# Patient Record
Sex: FEMALE | Race: WHITE | NOT HISPANIC OR LATINO | Employment: FULL TIME | URBAN - METROPOLITAN AREA
[De-identification: names, ages, dates, MRNs, and addresses within clinical notes are randomized per-mention and may not be internally consistent; named-entity substitution may affect disease eponyms.]

---

## 2017-01-18 ENCOUNTER — COMMUNICATION - HEALTHEAST (OUTPATIENT)
Dept: UROLOGY | Facility: CLINIC | Age: 42
End: 2017-01-18

## 2017-02-18 ENCOUNTER — COMMUNICATION - HEALTHEAST (OUTPATIENT)
Dept: EMERGENCY MEDICINE | Facility: CLINIC | Age: 42
End: 2017-02-18

## 2018-03-04 ENCOUNTER — HOSPITAL ENCOUNTER (EMERGENCY)
Facility: CLINIC | Age: 43
Discharge: HOME | End: 2018-03-04
Attending: EMERGENCY MEDICINE | Admitting: EMERGENCY MEDICINE
Payer: COMMERCIAL

## 2018-03-04 ENCOUNTER — APPOINTMENT (OUTPATIENT)
Dept: CT IMAGING | Facility: CLINIC | Age: 43
End: 2018-03-04
Attending: EMERGENCY MEDICINE
Payer: COMMERCIAL

## 2018-03-04 VITALS
OXYGEN SATURATION: 97 % | DIASTOLIC BLOOD PRESSURE: 79 MMHG | SYSTOLIC BLOOD PRESSURE: 114 MMHG | RESPIRATION RATE: 16 BRPM | HEIGHT: 63 IN | BODY MASS INDEX: 46.6 KG/M2 | HEART RATE: 87 BPM | WEIGHT: 263 LBS | TEMPERATURE: 97.5 F

## 2018-03-04 DIAGNOSIS — R10.9 FLANK PAIN: ICD-10-CM

## 2018-03-04 DIAGNOSIS — F41.9 ANXIETY: ICD-10-CM

## 2018-03-04 LAB
ALBUMIN SERPL-MCNC: 3.9 G/DL (ref 3.4–5)
ALBUMIN UR-MCNC: NEGATIVE MG/DL
ALP SERPL-CCNC: 88 U/L (ref 40–150)
ALT SERPL W P-5'-P-CCNC: 15 U/L (ref 0–50)
ANION GAP SERPL CALCULATED.3IONS-SCNC: 6 MMOL/L (ref 3–14)
APPEARANCE UR: ABNORMAL
AST SERPL W P-5'-P-CCNC: 10 U/L (ref 0–45)
BACTERIA #/AREA URNS HPF: ABNORMAL /HPF
BILIRUB SERPL-MCNC: 0.8 MG/DL (ref 0.2–1.3)
BILIRUB UR QL STRIP: NEGATIVE
BUN SERPL-MCNC: 10 MG/DL (ref 7–30)
CALCIUM SERPL-MCNC: 8.9 MG/DL (ref 8.5–10.1)
CHLORIDE SERPL-SCNC: 108 MMOL/L (ref 94–109)
CO2 SERPL-SCNC: 25 MMOL/L (ref 20–32)
COLOR UR AUTO: YELLOW
CREAT SERPL-MCNC: 0.64 MG/DL (ref 0.52–1.04)
D DIMER PPP FEU-MCNC: 0.3 UG/ML FEU (ref 0–0.5)
ERYTHROCYTE [DISTWIDTH] IN BLOOD BY AUTOMATED COUNT: 13.6 % (ref 10–15)
GFR SERPL CREATININE-BSD FRML MDRD: >90 ML/MIN/1.7M2
GLUCOSE SERPL-MCNC: 103 MG/DL (ref 70–99)
GLUCOSE UR STRIP-MCNC: NEGATIVE MG/DL
HCG SERPL QL: NEGATIVE
HCT VFR BLD AUTO: 45.1 % (ref 35–47)
HGB BLD-MCNC: 15.4 G/DL (ref 11.7–15.7)
HGB UR QL STRIP: ABNORMAL
KETONES UR STRIP-MCNC: NEGATIVE MG/DL
LEUKOCYTE ESTERASE UR QL STRIP: NEGATIVE
MCH RBC QN AUTO: 31.5 PG (ref 26.5–33)
MCHC RBC AUTO-ENTMCNC: 34.1 G/DL (ref 31.5–36.5)
MCV RBC AUTO: 92 FL (ref 78–100)
MUCOUS THREADS #/AREA URNS LPF: PRESENT /LPF
NITRATE UR QL: NEGATIVE
PH UR STRIP: 5 PH (ref 5–7)
PLATELET # BLD AUTO: 447 10E9/L (ref 150–450)
POTASSIUM SERPL-SCNC: 3.6 MMOL/L (ref 3.4–5.3)
PROT SERPL-MCNC: 7.6 G/DL (ref 6.8–8.8)
RBC # BLD AUTO: 4.89 10E12/L (ref 3.8–5.2)
RBC #/AREA URNS AUTO: 2 /HPF (ref 0–2)
SODIUM SERPL-SCNC: 139 MMOL/L (ref 133–144)
SOURCE: ABNORMAL
SP GR UR STRIP: 1.03 (ref 1–1.03)
SQUAMOUS #/AREA URNS AUTO: 2 /HPF (ref 0–1)
UROBILINOGEN UR STRIP-MCNC: 2 MG/DL (ref 0–2)
WBC # BLD AUTO: 12.4 10E9/L (ref 4–11)
WBC #/AREA URNS AUTO: 1 /HPF (ref 0–5)

## 2018-03-04 PROCEDURE — 99285 EMERGENCY DEPT VISIT HI MDM: CPT | Mod: 25

## 2018-03-04 PROCEDURE — 25000132 ZZH RX MED GY IP 250 OP 250 PS 637: Performed by: EMERGENCY MEDICINE

## 2018-03-04 PROCEDURE — 85027 COMPLETE CBC AUTOMATED: CPT | Performed by: EMERGENCY MEDICINE

## 2018-03-04 PROCEDURE — 84703 CHORIONIC GONADOTROPIN ASSAY: CPT | Performed by: EMERGENCY MEDICINE

## 2018-03-04 PROCEDURE — 80053 COMPREHEN METABOLIC PANEL: CPT | Performed by: EMERGENCY MEDICINE

## 2018-03-04 PROCEDURE — 81001 URINALYSIS AUTO W/SCOPE: CPT | Performed by: EMERGENCY MEDICINE

## 2018-03-04 PROCEDURE — 85379 FIBRIN DEGRADATION QUANT: CPT | Performed by: EMERGENCY MEDICINE

## 2018-03-04 PROCEDURE — 93005 ELECTROCARDIOGRAM TRACING: CPT

## 2018-03-04 PROCEDURE — 25000125 ZZHC RX 250: Performed by: EMERGENCY MEDICINE

## 2018-03-04 PROCEDURE — 74176 CT ABD & PELVIS W/O CONTRAST: CPT

## 2018-03-04 RX ORDER — LORAZEPAM 0.5 MG/1
0.5 TABLET ORAL ONCE
Status: COMPLETED | OUTPATIENT
Start: 2018-03-04 | End: 2018-03-04

## 2018-03-04 RX ORDER — ONDANSETRON 4 MG/1
4 TABLET, ORALLY DISINTEGRATING ORAL ONCE
Status: COMPLETED | OUTPATIENT
Start: 2018-03-04 | End: 2018-03-04

## 2018-03-04 RX ADMIN — LORAZEPAM 0.5 MG: 0.5 TABLET ORAL at 10:31

## 2018-03-04 RX ADMIN — ONDANSETRON 4 MG: 4 TABLET, ORALLY DISINTEGRATING ORAL at 12:47

## 2018-03-04 ASSESSMENT — ENCOUNTER SYMPTOMS
DIZZINESS: 0
HEADACHES: 0
NERVOUS/ANXIOUS: 1
DIARRHEA: 0
FATIGUE: 0
CHILLS: 0
BLOOD IN STOOL: 0
VOMITING: 0
NAUSEA: 1
CONSTIPATION: 0
DYSURIA: 0
WEAKNESS: 0
COUGH: 0
FLANK PAIN: 1
DIFFICULTY URINATING: 0
FEVER: 1
ABDOMINAL PAIN: 1
SHORTNESS OF BREATH: 0

## 2018-03-04 NOTE — ED AVS SNAPSHOT
[facility] Emergency Department    [address]    [facility] [address]    Phone:  [phone]    Fax:  [phone]                                       Isha Valderrama   MRN: [id number]    Department:  [facility] Emergency Department   Date of Visit:  3/4/2018           Patient Information     Date Of Birth          1975        Your diagnoses for this visit were:     Anxiety     Flank pain        You were seen by Roderick Bill MD.      Follow-up Information     Follow up with Dewayne Barrera MD. Schedule an appointment as soon as possible for a visit in 2 days.    Contact information:    [facility]  [address]    [facility] [place] [address]  [phone]          Follow up with [facility] Emergency Department.    Specialty:  EMERGENCY MEDICINE    Why:  If symptoms worsen    Contact information:    [address] [facility] [id number] [phone]        Discharge Instructions         Understanding Anxiety Disorders  Almost everyone gets nervous now and then. It s normal to have knots in your stomach before a test, or for your heart to race on a first date. But an anxiety disorder is much more than a case of nerves. In fact, its symptoms may be overwhelming. But treatment can relieve many of these symptoms. Talking to your healthcare provider is the first step.    What are anxiety disorders?  An anxiety disorder causes intense feelings of panic and fear. These feelings may arise for no apparent reason. And they tend to recur again and again. They may prevent you from coping with life and cause you great distress. As a result, you may avoid anything that triggers your fear. In extreme cases, you may never leave the house. Anxiety disorders may cause other symptoms, such as:    Obsessive thoughts you can t control    Constant nightmares or painful thoughts of the past    Nausea, sweating, and muscle  tension    Trouble sleeping or concentrating  What causes anxiety disorders?  Anxiety disorders tend to run in families. For some people, childhood abuse or neglect may play a role. For others, stressful life events or trauma may trigger anxiety disorders. Anxiety can trigger low self-esteem and poor coping skills.  Common anxiety disorders    Panic disorder. This causes an intense fear of being in danger.    Phobias. These are extreme fears of certain objects, places, or events.    Obsessive-compulsive disorder. This causes you to have unwanted thoughts and urges. You also may perform certain actions over and over.    Posttraumatic stress disorder. This occurs in people who have survived a terrible ordeal. It can cause nightmares and flashbacks about the event.    Generalized anxiety disorder. This causes constant worry that can greatly disrupt your life.   Getting better  You may believe that nothing can help you. Or, you might fear what others may think. But most anxiety symptoms can be eased. Having an anxiety disorder is nothing to be ashamed of. Most people do best with treatment that combines medicine and therapy. These aren t cures. But they can help you live a healthier life.  Date Last Reviewed: 2/1/2017 2000-2017 [employer]. [address] [place], [place] [address]. All rights reserved. This information is not intended as a substitute for professional medical care. Always follow your healthcare professional's instructions.          24 Hour Appointment Hotline       To make an appointment at any [facility], call [phone] ([phone]). If you don't have a family doctor or clinic, we will help you find one. [facility] are conveniently located to serve the needs of you and your family.             Review of your medicines      Our records show that you are taking the medicines listed below. If these are incorrect, please call your family doctor or clinic.        Dose /  Directions Last dose taken    ALBUTEROL IN        Refills:  0        DEPO-PROVERA IM        Refills:  0        FLOVENT IN        Refills:  0        ibuprofen 200 MG tablet   Commonly known as:  ADVIL/MOTRIN   Dose:  600 mg   Quantity:  60 tablet        Take 3 tablets (600 mg) by mouth every 8 hours as needed for mild pain   Refills:  0        ketorolac 10 MG tablet   Commonly known as:  TORADOL   Dose:  10 mg   Quantity:  8 tablet        Take 1 tablet (10 mg) by mouth every 6 hours as needed for moderate pain   Refills:  0        LORAZEPAM PO        Refills:  0        oxyCODONE-acetaminophen 5-325 MG per tablet   Commonly known as:  PERCOCET   Dose:  1 tablet   Quantity:  20 tablet        Take 1 tablet by mouth every 6 hours as needed for moderate to severe pain   Refills:  0        SERTRALINE HCL PO        Take by mouth daily   Refills:  0        TYLENOL PO        Refills:  0                Procedures and tests performed during your visit     Abd/pelvis CT no contrast - Stone Protocol    CBC (platelets, no diff)    Comprehensive metabolic panel    D dimer quantitative    EKG 12 lead    HCG QUALitative pregnancy (blood)    UA with Microscopic      Orders Needing Specimen Collection     None      Pending Results     Date and Time Order Name Status Description    3/4/2018 1006 EKG 12 lead Preliminary             Pending Culture Results     No orders found from 3/2/2018 to 3/5/2018.            Pending Results Instructions     If you had any lab results that were not finalized at the time of your Discharge, you can call the ED Lab Result RN at [phone]. You will be contacted by this team for any positive Lab results or changes in treatment. The nurses are available 7 days a week from 10A to 6:30P.  You can leave a message 24 hours per day and they will return your call.        Test Results From Your Hospital Stay        3/4/2018 10:43 AM      Component Results     Component Value Ref Range & Units Status    WBC 12.4  (H) 4.0 - 11.0 10e9/L Final    RBC Count 4.89 3.8 - 5.2 10e12/L Final    Hemoglobin 15.4 11.7 - 15.7 g/dL Final    Hematocrit 45.1 35.0 - 47.0 % Final    MCV 92 78 - 100 fl Final    MCH 31.5 26.5 - 33.0 pg Final    MCHC 34.1 31.5 - 36.5 g/dL Final    RDW 13.6 10.0 - 15.0 % Final    Platelet Count 447 150 - 450 10e9/L Final         3/4/2018 10:59 AM      Component Results     Component Value Ref Range & Units Status    Sodium 139 133 - 144 mmol/L Final    Potassium 3.6 3.4 - 5.3 mmol/L Final    Chloride 108 94 - 109 mmol/L Final    Carbon Dioxide 25 20 - 32 mmol/L Final    Anion Gap 6 3 - 14 mmol/L Final    Glucose 103 (H) 70 - 99 mg/dL Final    Urea Nitrogen 10 7 - 30 mg/dL Final    Creatinine 0.64 0.52 - 1.04 mg/dL Final    GFR Estimate >90 >60 mL/min/1.7m2 Final    Non  GFR Calc    GFR Estimate If Black >90 >60 mL/min/1.7m2 Final    African American GFR Calc    Calcium 8.9 8.5 - 10.1 mg/dL Final    Bilirubin Total 0.8 0.2 - 1.3 mg/dL Final    Albumin 3.9 3.4 - 5.0 g/dL Final    Protein Total 7.6 6.8 - 8.8 g/dL Final    Alkaline Phosphatase 88 40 - 150 U/L Final    ALT 15 0 - 50 U/L Final    AST 10 0 - 45 U/L Final         3/4/2018 10:59 AM      Component Results     Component Value Ref Range & Units Status    HCG Qualitative Serum Negative NEG^Negative Final    This test is for screening purposes.  Results should be interpreted along with   the clinical picture.  Confirmation testing is available if warranted by   ordering FXW196, HCG Quantitative Pregnancy.           3/4/2018 10:54 AM      Component Results     Component Value Ref Range & Units Status    D Dimer 0.3 0.0 - 0.50 ug/ml FEU Final    This D-dimer assay is intended for use in conjunction with a clinical pretest   probability assessment model to exclude pulmonary embolism (PE) and deep   venous thrombosis (DVT) in outpatients suspected of PE or DVT. The cut-off   value is 0.5 ug/mL FEU.           3/4/2018 11:02 AM      Component Results      Component Value Ref Range & Units Status    Color Urine Yellow  Final    Appearance Urine Slightly Cloudy  Final    Glucose Urine Negative NEG^Negative mg/dL Final    Bilirubin Urine Negative NEG^Negative Final    Ketones Urine Negative NEG^Negative mg/dL Final    Specific Gravity Urine 1.028 1.003 - 1.035 Final    Blood Urine Moderate (A) NEG^Negative Final    pH Urine 5.0 5.0 - 7.0 pH Final    Protein Albumin Urine Negative NEG^Negative mg/dL Final    Urobilinogen mg/dL 2.0 0.0 - 2.0 mg/dL Final    Nitrite Urine Negative NEG^Negative Final    Leukocyte Esterase Urine Negative NEG^Negative Final    Source Midstream Urine  Final    WBC Urine 1 0 - 5 /HPF Final    RBC Urine 2 0 - 2 /HPF Final    Bacteria Urine Few (A) NEG^Negative /HPF Final    Squamous Epithelial /HPF Urine 2 (H) 0 - 1 /HPF Final    Mucous Urine Present (A) NEG^Negative /LPF Final         3/4/2018 12:02 PM      Narrative     CT ABDOMEN AND PELVIS WITHOUT CONTRAST 3/4/2018 11:37 AM     HISTORY: Left flank pain.      COMPARISON: CT abdomen and pelvis 12/16/2015.    TECHNIQUE: Axial images are obtained from the lung bases to the  symphysis without oral or IV contrast. Coronal reformatted images are  also generated.  Radiation dose for this scan was reduced using  automated exposure control, adjustment of the mA and/or kV according  to patient size, or iterative reconstruction technique.    FINDINGS:   The lung bases are clear.    Abdomen: No urinary tract calculi or hydronephrosis. The upper  abdominal organs are otherwise within normal limits allowing for the  noncontrast technique. Probable liver cyst or hemangioma anterior left  hepatic lobe on series 2, image 22 is again noted. Probable additional  cyst or hemangioma lateral segment left hepatic lobe barely evident on  the current exam. This is too small to characterize by CT. Calcine  granuloma is noted in the spleen. No enlarged abdominal lymph nodes.  The bowel is normal in caliber without  obstruction or diverticulitis.  Appendix is normal.    Pelvis: The bladder is decompressed but otherwise unremarkable. The  uterus, ovaries, and rectum are unremarkable. Bone window examination  demonstrates degenerative lower thoracic and lumbar spine changes.        Impression     IMPRESSION: No urinary tract calculi or hydronephrosis. No acute  changes are evident in the abdomen or pelvis to account for patient's  symptoms. No bowel obstruction, diverticulitis, or appendicitis.  Indeterminate liver lesions are likely small cysts or hemangiomas.  These are incompletely characterized on this noncontrast exam.    HOSEA GREGG MD                Clinical Quality Measure: Blood Pressure Screening     Your blood pressure was checked while you were in the emergency department today. The last reading we obtained was  BP: 114/79 . Please read the guidelines below about what these numbers mean and what you should do about them.  If your systolic blood pressure (the top number) is less than 120 and your diastolic blood pressure (the bottom number) is less than 80, then your blood pressure is normal. There is nothing more that you need to do about it.  If your systolic blood pressure (the top number) is 120-139 or your diastolic blood pressure (the bottom number) is 80-89, your blood pressure may be higher than it should be. You should have your blood pressure rechecked within a year by a primary care provider.  If your systolic blood pressure (the top number) is 140 or greater or your diastolic blood pressure (the bottom number) is 90 or greater, you may have high blood pressure. High blood pressure is treatable, but if left untreated over time it can put you at risk for heart attack, stroke, or kidney failure. You should have your blood pressure rechecked by a primary care provider within the next 4 weeks.  If your provider in the emergency department today gave you specific instructions to follow-up with your doctor or  "provider even sooner than that, you should follow that instruction and not wait for up to 4 weeks for your follow-up visit.        Thank you for choosing [facility]       Thank you for choosing [facility] for your care. Our goal is always to provide you with excellent care. Hearing back from our patients is one way we can continue to improve our services. Please take a few minutes to complete the written survey that you may receive in the mail after you visit with us. Thank you!        [employer] Information     [employer] lets you send messages to your doctor, view your test results, renew your prescriptions, schedule appointments and more. To sign up, go to [email or web address] . Click on \"Log in\" on the left side of the screen, which will take you to the Welcome page. Then click on \"Sign up Now\" on the right side of the page.     You will be asked to enter the access code listed below, as well as some personal information. Please follow the directions to create your username and password.     Your access code is: [email or web address]  Expires: 2018 12:38 PM     Your access code will  in 90 days. If you need help or a new code, please call your [facility] clinic or [phone].        Care EveryWhere ID     This is your Care EveryWhere ID. This could be used by other organizations to access your [facility] medical records  [id number]        Equal Access to Services     GE SADLER AH: Hadii brian Escudero, waaxda luqadaha, qaybta kaalcatie luna. So [facility] [phone].    ATENCIÓN: Si habla español, tiene a cobb disposición servicios gratuitos de asistencia lingüística. Joel al [phone].    We comply with applicable federal civil rights laws and [place] laws. We do not discriminate on the basis of race, color, national origin, age, disability, sex, sexual orientation, or gender identity.            After Visit Summary       This is your record. Keep this with you " and show to your community pharmacist(s) and doctor(s) at your next visit.

## 2018-03-04 NOTE — LETTER
March 4, 2018      To Whom It May Concern:      Isha Valderrama was seen in our Emergency Department today, 03/04/18.  I expect her condition to improve over the next 1-2 days.  She may return to work/school when improved.    Sincerely,        Collette Ochoa RN

## 2018-03-04 NOTE — ED AVS SNAPSHOT
[facility] Emergency Department    [address]    [facility] [address]    Phone:  [phone]    Fax:  [phone]                                       Isha Valderrama   MRN: [id number]    Department:  [facility] Emergency Department   Date of Visit:  3/4/2018           After Visit Summary Signature Page     I have received my discharge instructions, and my questions have been answered. I have discussed any challenges I see with this plan with the nurse or doctor.    ..........................................................................................................................................  Patient/Patient Representative Signature      ..........................................................................................................................................  Patient Representative Print Name and Relationship to Patient    ..................................................               ................................................  Date                                            Time    ..........................................................................................................................................  Reviewed by Signature/Title    ...................................................              ..............................................  Date                                                            Time

## 2018-03-04 NOTE — DISCHARGE INSTRUCTIONS
Understanding Anxiety Disorders  Almost everyone gets nervous now and then. It s normal to have knots in your stomach before a test, or for your heart to race on a first date. But an anxiety disorder is much more than a case of nerves. In fact, its symptoms may be overwhelming. But treatment can relieve many of these symptoms. Talking to your healthcare provider is the first step.    What are anxiety disorders?  An anxiety disorder causes intense feelings of panic and fear. These feelings may arise for no apparent reason. And they tend to recur again and again. They may prevent you from coping with life and cause you great distress. As a result, you may avoid anything that triggers your fear. In extreme cases, you may never leave the house. Anxiety disorders may cause other symptoms, such as:    Obsessive thoughts you can t control    Constant nightmares or painful thoughts of the past    Nausea, sweating, and muscle tension    Trouble sleeping or concentrating  What causes anxiety disorders?  Anxiety disorders tend to run in families. For some people, childhood abuse or neglect may play a role. For others, stressful life events or trauma may trigger anxiety disorders. Anxiety can trigger low self-esteem and poor coping skills.  Common anxiety disorders    Panic disorder. This causes an intense fear of being in danger.    Phobias. These are extreme fears of certain objects, places, or events.    Obsessive-compulsive disorder. This causes you to have unwanted thoughts and urges. You also may perform certain actions over and over.    Posttraumatic stress disorder. This occurs in people who have survived a terrible ordeal. It can cause nightmares and flashbacks about the event.    Generalized anxiety disorder. This causes constant worry that can greatly disrupt your life.   Getting better  You may believe that nothing can help you. Or, you might fear what others may think. But most anxiety symptoms can be eased.  Having an anxiety disorder is nothing to be ashamed of. Most people do best with treatment that combines medicine and therapy. These aren t cures. But they can help you live a healthier life.  Date Last Reviewed: 2/1/2017 2000-2017 [employer]. [address] [place] [address], [place] [address]. All rights reserved. This information is not intended as a substitute for professional medical care. Always follow your healthcare professional's instructions.

## 2018-03-04 NOTE — ED NOTES
Complains of nausea. Given a zofran odt prior to discharge. She has these at home. Still reporting very mild left flank pain. Still with some anxiety, though she states the 0.5 mg ativan helped some. Given work note.

## 2018-03-04 NOTE — ED PROVIDER NOTES
History     Chief Complaint:  Anxiety, Flank Pain, and Cyst    HPI   Isha Valderrama is a 42 year old female who presents with anxiety, flank pain, and cyst. The patient presents today stating that she has been experiencing symptoms of left sided flank pain, nausea, low grade fevers, and slight abdominal pain for the past few days. She reports a history of anxiety and states that her symptoms have caused her to become worried for serious pathology. She has a history of kidney stones and reports that her flank pain feels somewhat similar to that of previous episodes of kidney stones, although questions if she is early in the course of her illness. Of note, patient also reports a cyst on her right breast that ruptured as of last night, but has not been causing any bleeding or discharge. There was no report of bowel complications, urinary symptoms, headaches, weakness, syncope, or any other symptoms. She denies any thoughts of suicide or self harm.  She declines the opportunity to speak with our counselor/DEC.    Allergies:  Compazine [Prochlorperazine]  Morphine     Medications:    Acetaminophen (TYLENOL PO)  SERTRALINE HCL PO  LORAZEPAM PO  MedroxyPROGESTERone Acetate (DEPO-PROVERA IM)  ALBUTEROL IN  ibuprofen (ADVIL,MOTRIN) 200 MG tablet  ketorolac (TORADOL) 10 MG tablet      Past Medical History:    Anxiety  Kidney stones  Uncomplicated asthma    Past Surgical History:    Orthopedic surgery  Bilateral ureteral stents  Tonsillectomy    Family History:    No pertinent family history.    Social History:  Smoking status: Current smoker  Alcohol use: No  Marital Status:  Single      Review of Systems   Constitutional: Positive for fever. Negative for chills and fatigue.   Respiratory: Negative for cough and shortness of breath.    Cardiovascular: Negative for chest pain.   Gastrointestinal: Positive for abdominal pain and nausea. Negative for blood in stool, constipation, diarrhea and vomiting.   Genitourinary:  "Positive for flank pain. Negative for difficulty urinating and dysuria.   Skin: Negative for rash.   Neurological: Negative for dizziness, weakness and headaches.   Psychiatric/Behavioral: The patient is nervous/anxious.    All other systems reviewed and are negative.    Physical Exam     Patient Vitals for the past 24 hrs:   BP Temp Temp src Pulse Heart Rate Resp SpO2 Height Weight   03/04/18 1007 114/79 97.5  F (36.4  C) Temporal 87 87 16 97 % 1.6 m (5' 3\") 119.3 kg (263 lb)         Physical Exam  General:                        Well-nourished                        Speaking in full sentences                        Well appearing                        Anxious affect  Eyes:                        Conjunctiva without injection or scleral icterus                        PERRL  ENT:                        Moist mucous membranes                        Posterior oropharynx clear without erythema or exudate                        Nares patent                        Pinnae normal  Neck:                        Full ROM                        No stiffness appreciated  Resp:                        Lungs CTAB                        No crackles, wheezing or audible rubs                        Good air movement  CV:                                        Normal rate, regular rhythm                        S1 and S2 present                        No murmur, gallop or rub  Breast:                        (With female chaperone present)                        A few erythematous lesions, no fluctuance, no focal tenderness to palpation  GI:                        BS present                        Abdomen soft without distention                        Non-tender to light and deep palpation                        No guarding or rebound tenderness                        Tenderness to palpation over left CVA region  Skin:                        Warm, dry, well perfused                        No rashes or open wounds on exposed " skin  MSK:                        Moves all extremities                        No focal deformities or swelling  Neuro:                        Alert                        Answers questions appropriately                        Moves all extremities equally                        Gait stable  Psych:                        Anxious                        Maintains eye contact                        Not attending to internal stimuli                        Denies SI or HI    Emergency Department Course   ECG:  @ 1038  Indication: anxiety  Vent. Rate 66 bpm. AK interval 128 ms. QRS duration 88 ms. QT/QTc 386/404 ms. P-R-T axis 43 34 24.   Normal sinus rhythm. Normal ECG.    Read @ 1041 by Dr. Bill.    Imaging:  Abdomen/Pelvis CT with IV contrast:  IMPRESSION: No urinary tract calculi or hydronephrosis. No acute changes are evident in the abdomen or pelvis to account for patient's symptoms. No bowel obstruction, diverticulitis, or appendicitis. Indeterminate liver lesions are likely small cysts or hemangiomas. These are incompletely characterized on this noncontrast exam.  Report per radiology.      Laboratory:  CBC:  WBC 12.4 (H), HGB 15.4, , otherwise WNL  CMP: Glucose 103 (H), otherwise WNL (Creatinine 0.64)  (1020) D Dimer: 0.3  HCG: negative    UA: Slightly Cloudy yellow urine, Blood Moderate, Bacteria Few, Squamous Epithelial 2 (H), Mucous Present, otherwise WNL    Interventions:  (1031) Lorazepam, 0.5 mg, IV injection    Emergency Department Course:  Nursing notes and vitals reviewed.  (0959) I performed an exam of the patient as documented above.    Blood was drawn from the patient. This was sent for laboratory testing, findings above.  Urine sample was obtained and sent for laboratory analysis, findings above.  EKG was done, interpretation as above.    Findings and plan explained to the patient. Patient discharged home with instructions regarding supportive care, medications, and reasons to return. The  importance of close follow-up was reviewed.   Impression & Plan    Medical Decision Making:  Isha Valderrama is a 42 yr old F presenting to the emergency deferment for evaluation of anxiety, flank pain, and possible cyst.  VS on presentation are unremarkable.  With regards to patient's anxiety, she notes a history of chronic anxiety and panic attacks.  She feels as though her current symptoms are similar to that although is unaware of any particular trigger.  She notes prior flares of anxiety are not necessarily exacerbated or associated with any particular stressor.  She is currently on sertraline and as needed Ativan for her treatment at home.  She denies any thoughts of self-harm or suicide.  I offered for her to speak with our counselor although she politely declined.  She was provided 1 tab of oral Ativan with some improvements in symptoms. Pt not meeting criteria for involuntary hold and is not felt at risk of self harm or harm to others. I considered other etiologies or contributing factors to her current presentation.  She notes associated left-sided flank pain and has a history of kidney stones.  Urinalysis does not reveal convincing evidence of infection nor evidence of red blood cells.  After discussion of risks and benefits, the patient elected to proceed with noncontrast CT which demonstrates no evidence of hydronephrosis nor urinary stone.  She does have indeterminate liver lesions, likely small cysts or hemangiomas although no evidence of bowel obstruction, diverticulitis or acute appendicitis. This was reviewed with the patient. She exhibits no lower abdominal pain to suggest referred ovarian process.  I considered for pulmonary process such as pulmonary embolism though feel this to be unlikely.  Patient is low risk by well's criteria and d-dimer is within normal limits.  Her pulmonary exam is otherwise clear and patient denies associated shortness of breath nor chest pain.  Pregnancy test is  negative.  Metabolic panel unremarkable.  CBC reveals mild leukocytosis with a WBC count of 12.4 though otherwise is negative.  She expressed concern regarding possible cyst beneath her right breast although on exam she does not have any region of fluctuance amenable to percutaneous drainage at this time.  She noted spontaneous drainage yesterday evening, and I suspect this has resolved her underlying subcutaneous abscess.  Recommended she continue to monitor this area and may use warm compresses to assist with drainage.  She does not exhibit signs to suggest cellulitis at this time.  Results of the above studies were discussed with the patient.  At this point I feel she can be safely discharged home with close outpatient follow-up.  Recommended close monitoring of symptoms, and follow-up with PCP early this coming week.  Return to ER with any new or troubling symptoms.  All questions are answered prior to discharge.       Diagnosis:    ICD-10-CM   1. Anxiety F41.9   2. Flank pain R10.9       Disposition:  Patient is discharged to home.            IEarnest, am serving as a scribe on 3/4/2018 at 9:59 AM to personally document services performed by Dr. Bill based on my observations and the provider's statements to me.         Earnest Pedraza  3/4/2018   [facility] EMERGENCY DEPARTMENT       Roderick Bill MD  03/05/18 0946

## 2018-03-04 NOTE — ED NOTES
"1.  Patient reports increased anxiety for a couple of days. She takes Sertraline 200 mg per day and has been using Lorazepam for breakthrough ankiety, but is still feeling \"off\".    2.  Left flank pain for a couple of days. Some nausea. Temp 99.4 this morning. History of kidney stones.    3.  Cyst under left breast, draining. \"It popped last night\", dime sized.     "

## 2018-03-05 LAB — INTERPRETATION ECG - MUSE: NORMAL

## 2021-05-28 ENCOUNTER — RECORDS - HEALTHEAST (OUTPATIENT)
Dept: ADMINISTRATIVE | Facility: CLINIC | Age: 46
End: 2021-05-28

## 2021-05-29 ENCOUNTER — HEALTH MAINTENANCE LETTER (OUTPATIENT)
Age: 46
End: 2021-05-29

## 2021-05-29 ENCOUNTER — RECORDS - HEALTHEAST (OUTPATIENT)
Dept: ADMINISTRATIVE | Facility: CLINIC | Age: 46
End: 2021-05-29

## 2021-05-30 ENCOUNTER — RECORDS - HEALTHEAST (OUTPATIENT)
Dept: ADMINISTRATIVE | Facility: CLINIC | Age: 46
End: 2021-05-30

## 2021-05-31 ENCOUNTER — RECORDS - HEALTHEAST (OUTPATIENT)
Dept: ADMINISTRATIVE | Facility: CLINIC | Age: 46
End: 2021-05-31

## 2021-06-01 ENCOUNTER — RECORDS - HEALTHEAST (OUTPATIENT)
Dept: ADMINISTRATIVE | Facility: CLINIC | Age: 46
End: 2021-06-01

## 2021-06-02 ENCOUNTER — RECORDS - HEALTHEAST (OUTPATIENT)
Dept: ADMINISTRATIVE | Facility: CLINIC | Age: 46
End: 2021-06-02

## 2021-09-18 ENCOUNTER — HEALTH MAINTENANCE LETTER (OUTPATIENT)
Age: 46
End: 2021-09-18

## 2021-11-23 ENCOUNTER — HOSPITAL ENCOUNTER (EMERGENCY)
Facility: CLINIC | Age: 46
Discharge: HOME | End: 2021-11-23
Attending: EMERGENCY MEDICINE | Admitting: EMERGENCY MEDICINE
Payer: COMMERCIAL

## 2021-11-23 VITALS
TEMPERATURE: 98.2 F | SYSTOLIC BLOOD PRESSURE: 152 MMHG | RESPIRATION RATE: 16 BRPM | HEART RATE: 74 BPM | WEIGHT: 250 LBS | DIASTOLIC BLOOD PRESSURE: 100 MMHG | BODY MASS INDEX: 44.29 KG/M2 | OXYGEN SATURATION: 96 %

## 2021-11-23 DIAGNOSIS — Z20.822 SUSPECTED 2019 NOVEL CORONAVIRUS INFECTION: ICD-10-CM

## 2021-11-23 LAB
FLUAV RNA SPEC QL NAA+PROBE: NEGATIVE
FLUBV RNA RESP QL NAA+PROBE: NEGATIVE
SARS-COV-2 RNA RESP QL NAA+PROBE: NEGATIVE

## 2021-11-23 PROCEDURE — C9803 HOPD COVID-19 SPEC COLLECT: HCPCS

## 2021-11-23 PROCEDURE — 87636 SARSCOV2 & INF A&B AMP PRB: CPT | Performed by: EMERGENCY MEDICINE

## 2021-11-23 PROCEDURE — 99283 EMERGENCY DEPT VISIT LOW MDM: CPT

## 2021-11-23 NOTE — Clinical Note
Isha Valderrama was seen and treated in our emergency department on 11/23/2021.  She may return to work on 11/24/2021.  You have been tested for both Covid and influenza today.  Your results will return later today if these are positive you will be contacted.  Continue Tylenol for general aches and fevers.  Use Zofran as discussed for nausea vomiting.     If you have any questions or concerns, please don't hesitate to call.      Ignacio Basurto MD

## 2021-11-23 NOTE — ED PROVIDER NOTES
EMERGENCY DEPARTMENT ENCOUNTER      NAME: Isha Valderrama  AGE: 46 year old female  YOB: 1975  MRN: [id number]  EVALUATION DATE & TIME: 11/23/2021  5:43 AM    PCP: Dewayne Barrera    ED PROVIDER: Ignacio Basurto M.D.      Chief Complaint   Patient presents with     Fever     Headache     Cough         FINAL IMPRESSION:  1. Suspected 2019 novel coronavirus infection          ED COURSE & MEDICAL DECISION MAKING:    Pertinent Labs & Imaging studies reviewed. (See chart for details)  46 year old female presents to the Emergency Department for evaluation of general malaise.  Patient reports started feeling ill yesterday.  Describes a general achiness along with nausea.  Low-grade fevers to 99.9.  Recently exposed to Covid.  2 other household members with similar symptomatology.  Patient fully vaccinated.  Does smoke a pack of cigarettes a day.  On exam morbidly obese female mild distress.  Vital signs reassuring other than slight hypertension.  Lungs with slight wheezing consistent with her asthma.  No respiratory distress.  Remainder exam benign.  Patient likely with acute viral process.  Possibility of Covid versus influenza versus unspecified.  Patient was Zofran at home and Tylenol for symptomatic relief.  Will obtain Covid/influenza swab.  Patient dismissed and cautioned to remain in isolation until results return.  Patient appears non toxic with stable vitals signs.     6:08 AM I met with the patient for the initial interview and physical examination. Discussed plan for treatment and workup in the ED.    6:15 AM We discussed the plan for discharge and the patient is agreeable. Reviewed supportive cares, symptomatic treatment, outpatient follow up, and reasons to return to the Emergency Department. Patient to be discharged by ED RN.   8:36 AM.  Labs reviewed.  Influenza and Covid swabs negative  At the conclusion of the encounter I discussed the results of all of the tests and the disposition. The  questions were answered and return precautions provided. The patient or family acknowledged understanding and was agreeable with the care plan.       PPE: Provider wore gloves, N95 mask, eye protection, surgical cap, and gown.     MEDICATIONS GIVEN IN THE EMERGENCY:  Medications - No data to display    NEW PRESCRIPTIONS STARTED AT TODAY'S ER VISIT  New Prescriptions    No medications on file          =================================================================    HPI    Patient information was obtained from: Patient     Use of Intrepreter: N/A        Isha Valderrama is a 46 year old female with a pertient medical history of asthma and anxiety who presents to the ED for evaluation of cough and body aches.     Patient reports that she started to feel poorly yesterday with symptoms including cough and generalized body aches. She also endorses a couple episodes of vomiting. Her boyfriend and step-daughter are sick with similar symptoms; patient adds that her step-daughter was recently exposed to a COVID-19 positive individual. No other symptoms or complaints at this time.       REVIEW OF SYSTEMS   Constitutional:  Denies fever, chills  Respiratory:  Endorses cough. Denies increased work of breathing  Cardiovascular:  Denies chest pain, palpitations  GI:  Endorses vomiting. Denies abdominal pain, nausea, or change in bowel or bladder habits   Musculoskeletal:  Endorses generalized body aches. Denies any new muscle/joint swelling  Skin:  Denies rash   Neurologic:  Denies focal weakness  All systems negative except as marked.     PAST MEDICAL HISTORY:  Past Medical History:   Diagnosis Date     Anxiety      Kidney stone      Uncomplicated asthma        PAST SURGICAL HISTORY:  Past Surgical History:   Procedure Laterality Date     KIDNEY STONE SURGERY      Kidney Stone [facility] (Dr. Ku)     KNEE SURGERY       ORTHOPEDIC SURGERY       OTHER SURGICAL HISTORY      bilateral ureter stents     TONSILLECTOMY        TONSILLECTOMY           CURRENT MEDICATIONS:    No current facility-administered medications for this encounter.    Current Outpatient Medications:      Acetaminophen (TYLENOL PO), , Disp: , Rfl:      ALBUTEROL IN, , Disp: , Rfl:      Fluticasone Propionate, Inhal, (FLOVENT IN), , Disp: , Rfl:      ibuprofen (ADVIL,MOTRIN) 200 MG tablet, Take 3 tablets (600 mg) by mouth every 8 hours as needed for mild pain, Disp: 60 tablet, Rfl: 0     ketorolac (TORADOL) 10 MG tablet, Take 1 tablet (10 mg) by mouth every 6 hours as needed for moderate pain, Disp: 8 tablet, Rfl: 0     LORAZEPAM PO, , Disp: , Rfl:      MedroxyPROGESTERone Acetate (DEPO-PROVERA IM), , Disp: , Rfl:      oxyCODONE-acetaminophen (PERCOCET) 5-325 MG per tablet, Take 1 tablet by mouth every 6 hours as needed for moderate to severe pain, Disp: 20 tablet, Rfl: 0     SERTRALINE HCL PO, Take by mouth daily, Disp: , Rfl:     ALLERGIES:  Allergies   Allergen Reactions     Compazine [Prochlorperazine] Other (See Comments)     Dystonic reaction     Morphine Itching       FAMILY HISTORY:  Family History   Problem Relation Age of Onset     Urolithiasis Father         recurrent     Urolithiasis Paternal Aunt         recurrent     Urolithiasis Paternal Grandmother         recurrent       SOCIAL HISTORY:   Social History     Socioeconomic History     Marital status: Single     Spouse name: Not on file     Number of children: Not on file     Years of education: Not on file     Highest education level: Not on file   Occupational History     Not on file   Tobacco Use     Smoking status: Current Every Day Smoker     Packs/day: 1.00     Smokeless tobacco: Not on file   Substance and Sexual Activity     Alcohol use: No     Drug use: No     Sexual activity: Not on file   Other Topics Concern     Not on file   Social History Narrative    Lives with family.     Social Determinants of Health     Financial Resource Strain: Not on file   Food Insecurity: Not on file    Transportation Needs: Not on file   Physical Activity: Not on file   Stress: Not on file   Social Connections: Not on file   Intimate Partner Violence: Not on file   Housing Stability: Not on file       VITALS:  Patient Vitals for the past 24 hrs:   BP Temp Temp src Pulse SpO2 Weight   11/23/21 0538 (!) 157/111 98.2  F (36.8  C) Oral 82 99 % 113.4 kg (250 lb)        PHYSICAL EXAM    Constitutional:  Morbidly obese female, awake, alert, in no apparent distress  HENT:  Normocephalic, Atraumatic. Bilateral external ears normal. Oropharynx moist. Nose normal. Neck- Normal range of motion with no guarding, No midline cervical tenderness, Supple, No stridor.   Eyes:  PERRL, EOMI with no signs of entrapment, Conjunctiva normal, No discharge.   Respiratory:  Bronchitic cough, minimal wheezing, No respiratory distress.    Cardiovascular:  Normal heart rate, Normal rhythm, No appreciable rubs or gallops.   GI:  Soft, No tenderness, No distension, No palpable masses  Musculoskeletal:   Good range of motion in all major joints.   Integument:  Warm, Dry, No erythema, No rash.   Neurologic:  Alert & oriented, Normal motor function, Normal sensory function, No focal deficits noted.   Psychiatric:  Affect normal, Judgment normal, Mood normal.         I, Lisa Manning, am serving as a scribe to document services personally performed by Ignacio Basurto MD, based on my observation and the provider's statements to me. I, Ignacio Basurto MD attest that Lisa Manning is acting in a scribe capacity, has observed my performance of the services and has documented them in accordance with my direction.    Ignacio Basruto M.D.  Emergency Medicine  [facility] EMERGENCY ROOM     Ignacio Basurto MD  11/23/21 0636       Ignacio Basurto MD  11/23/21 0836

## 2021-11-23 NOTE — ED TRIAGE NOTES
Started to feel poorly yesterday.  Step daughter moved back into home.  She was exposed to covid positive mother.  States headache, body aches, fever.  Denies nausea.

## 2022-01-08 ENCOUNTER — HOSPITAL ENCOUNTER (EMERGENCY)
Facility: CLINIC | Age: 47
Discharge: HOME | End: 2022-01-08
Admitting: PHYSICIAN ASSISTANT

## 2022-01-08 VITALS
SYSTOLIC BLOOD PRESSURE: 143 MMHG | OXYGEN SATURATION: 97 % | WEIGHT: 250 LBS | BODY MASS INDEX: 44.29 KG/M2 | HEART RATE: 87 BPM | DIASTOLIC BLOOD PRESSURE: 90 MMHG | TEMPERATURE: 98 F

## 2022-01-08 DIAGNOSIS — Z20.822 SUSPECTED 2019 NOVEL CORONAVIRUS INFECTION: ICD-10-CM

## 2022-01-08 DIAGNOSIS — R52 BODY ACHES: ICD-10-CM

## 2022-01-08 PROCEDURE — 99283 EMERGENCY DEPT VISIT LOW MDM: CPT

## 2022-01-08 RX ORDER — OXYCODONE HYDROCHLORIDE 5 MG/1
5 TABLET ORAL EVERY 6 HOURS PRN
Qty: 8 TABLET | Refills: 0 | Status: SHIPPED | OUTPATIENT
Start: 2022-01-08 | End: 2023-06-14

## 2022-01-08 RX ORDER — CYCLOBENZAPRINE HCL 10 MG
10 TABLET ORAL 3 TIMES DAILY PRN
Qty: 20 TABLET | Refills: 0 | Status: SHIPPED | OUTPATIENT
Start: 2022-01-08 | End: 2022-01-14

## 2022-01-08 ASSESSMENT — ENCOUNTER SYMPTOMS
MYALGIAS: 1
BACK PAIN: 1
SHORTNESS OF BREATH: 1
FEVER: 1
PSYCHIATRIC NEGATIVE: 1
FATIGUE: 1
ARTHRALGIAS: 1
COUGH: 1
NEUROLOGICAL NEGATIVE: 1
GASTROINTESTINAL NEGATIVE: 1

## 2022-01-09 NOTE — ED TRIAGE NOTES
Pt states partner was positive and partners child is positive.  Has headaches, body aches, mild cough.

## 2022-01-09 NOTE — ED PROVIDER NOTES
EMERGENCY DEPARTMENT ENCOUNTER      NAME: Isha Valderrama  AGE: 46 year old female  YOB: 1975  MRN: [id number]  EVALUATION DATE & TIME: No admission date for patient encounter.    PCP: Dewayne Barrera    ED PROVIDER: Hans Phelps PA-C      Chief Complaint   Patient presents with     Generalized Body Aches     Headache     Cough         FINAL IMPRESSION:  1. Suspected 2019 novel coronavirus infection    2. Body aches          MEDICAL DECISION MAKING:    Pertinent Labs & Imaging studies reviewed. (See chart for details)  46 year old female presents to the Emergency Department for evaluation of suspected COVID-19, requesting medications to help with symptoms such as significant muscle aches and body aches.    After obtaining history present illness, reviewing vitals and performing brief examination I did not feel that any emergent work-up was necessary.  Patient did have a COVID-19 test performed on Wednesday she is waiting on results.  She has positive contacts that are living in the house with her.  She is not showing any signs of respiratory distress or hypoxia.  No significant complaints other than body aches and inability to rest.  Because of this I will provide a small prescription of narcotic pain medication as well as muscle relaxants.  She should use these sparingly and primarily in the evenings to help rest.  If there are need for further prescription she should seek these through the primary care.  If she develops any worsening symptoms with regards to her Covid infection such as chest pain, shortness of breath, or worsening fatigue she should return to the ED.         ED COURSE  9:27 PM I met with the patient, obtained history, performed an initial exam, and discussed options and plan for diagnostics and treatment here in the ED.    PPE worn throughout all patient encounters; N95 mask, safety glasses, and gloves.    At the conclusion of the encounter I discussed the results of all of  the tests and the disposition. The questions were answered. The patient or family acknowledged understanding and was agreeable with the care plan.     MEDICATIONS GIVEN IN THE EMERGENCY:  Medications - No data to display    NEW PRESCRIPTIONS STARTED AT TODAY'S ER VISIT  New Prescriptions    CYCLOBENZAPRINE (FLEXERIL) 10 MG TABLET    Take 1 tablet (10 mg) by mouth 3 times daily as needed for muscle spasms    OXYCODONE (ROXICODONE) 5 MG TABLET    Take 1 tablet (5 mg) by mouth every 6 hours as needed for pain            =================================================================    HPI    Patient information was obtained from: patient     Use of Interpretor: N/A         Isha Valderrama is a 46 year old female with a pertinent history of obesity and asthma who presents to this ED requesting help with body aches and muscle aches related to suspected COVID-19.  Patient reports that she developed body aches muscle aches starting on Monday.  She reports that people living inside her house have tested positive for Covid.  She had a rapid test on Monday that was negative and repeat PCR test on Wednesday for which the results have not returned.  She has been maxing out on Tylenol and ibuprofen at home without ability to manage her body aches and pain and she is struggled resting.  She is simply here requesting help with body ache management.  She denies any acute chest pain or shortness of breath.  No reports of nausea or vomiting.      REVIEW OF SYSTEMS   Review of Systems   Constitutional: Positive for fatigue and fever.   HENT: Negative.    Respiratory: Positive for cough and shortness of breath.    Cardiovascular: Negative for chest pain.   Gastrointestinal: Negative.    Genitourinary: Negative.    Musculoskeletal: Positive for arthralgias, back pain and myalgias.   Neurological: Negative.    Psychiatric/Behavioral: Negative.           PAST MEDICAL HISTORY:  Past Medical History:   Diagnosis Date     Anxiety       Kidney stone      Uncomplicated asthma        PAST SURGICAL HISTORY:  Past Surgical History:   Procedure Laterality Date     KIDNEY STONE SURGERY      Kidney Stone [place] (Dr. Ku)     KNEE SURGERY       ORTHOPEDIC SURGERY       OTHER SURGICAL HISTORY      bilateral ureter stents     TONSILLECTOMY       TONSILLECTOMY           CURRENT MEDICATIONS:    No current facility-administered medications for this encounter.    Current Outpatient Medications:      cyclobenzaprine (FLEXERIL) 10 MG tablet, Take 1 tablet (10 mg) by mouth 3 times daily as needed for muscle spasms, Disp: 20 tablet, Rfl: 0     oxyCODONE (ROXICODONE) 5 MG tablet, Take 1 tablet (5 mg) by mouth every 6 hours as needed for pain, Disp: 8 tablet, Rfl: 0     Acetaminophen (TYLENOL PO), , Disp: , Rfl:      ALBUTEROL IN, , Disp: , Rfl:      Fluticasone Propionate, Inhal, (FLOVENT IN), , Disp: , Rfl:      ibuprofen (ADVIL,MOTRIN) 200 MG tablet, Take 3 tablets (600 mg) by mouth every 8 hours as needed for mild pain, Disp: 60 tablet, Rfl: 0     ketorolac (TORADOL) 10 MG tablet, Take 1 tablet (10 mg) by mouth every 6 hours as needed for moderate pain, Disp: 8 tablet, Rfl: 0     LORAZEPAM PO, , Disp: , Rfl:      MedroxyPROGESTERone Acetate (DEPO-PROVERA IM), , Disp: , Rfl:      oxyCODONE-acetaminophen (PERCOCET) 5-325 MG per tablet, Take 1 tablet by mouth every 6 hours as needed for moderate to severe pain, Disp: 20 tablet, Rfl: 0     SERTRALINE HCL PO, Take by mouth daily, Disp: , Rfl:       ALLERGIES:  Allergies   Allergen Reactions     Compazine [Prochlorperazine] Other (See Comments)     Dystonic reaction     Morphine Itching       FAMILY HISTORY:  Family History   Problem Relation Age of Onset     Urolithiasis Father         recurrent     Urolithiasis Paternal Aunt         recurrent     Urolithiasis Paternal Grandmother         recurrent       SOCIAL HISTORY:   Social History     Socioeconomic History     Marital status: Single     Spouse name: Not on  file     Number of children: Not on file     Years of education: Not on file     Highest education level: Not on file   Occupational History     Not on file   Tobacco Use     Smoking status: Current Every Day Smoker     Packs/day: 1.00     Smokeless tobacco: Not on file   Substance and Sexual Activity     Alcohol use: No     Drug use: No     Sexual activity: Not on file   Other Topics Concern     Not on file   Social History Narrative    Lives with family.     Social Determinants of Health     Financial Resource Strain: Not on file   Food Insecurity: Not on file   Transportation Needs: Not on file   Physical Activity: Not on file   Stress: Not on file   Social Connections: Not on file   Intimate Partner Violence: Not on file   Housing Stability: Not on file       VITALS:  Patient Vitals for the past 24 hrs:   BP Temp Temp src Pulse SpO2 Weight   01/08/22 2124 (!) 143/90 98  F (36.7  C) Oral 87 97 % 113.4 kg (250 lb)       PHYSICAL EXAM    Physical Exam  Vitals and nursing note reviewed.   Constitutional:       General: She is not in acute distress.     Appearance: She is obese. She is not ill-appearing.   HENT:      Head: Normocephalic.      Right Ear: External ear normal.      Left Ear: External ear normal.      Nose: Nose normal.   Eyes:      Conjunctiva/sclera: Conjunctivae normal.   Cardiovascular:      Rate and Rhythm: Normal rate.      Pulses: Normal pulses.      Heart sounds: Normal heart sounds.   Pulmonary:      Effort: Pulmonary effort is normal. No respiratory distress.      Breath sounds: No stridor. No wheezing.   Musculoskeletal:         General: No deformity or signs of injury. Normal range of motion.      Cervical back: Normal range of motion.   Skin:     General: Skin is warm and dry.      Findings: No bruising or rash.   Neurological:      General: No focal deficit present.      Mental Status: She is alert. Mental status is at baseline.      Sensory: No sensory deficit.      Motor: No weakness.    Psychiatric:         Mood and Affect: Mood normal.          LAB:  All pertinent labs reviewed and interpreted.       RADIOLOGY:  Reviewed all pertinent imaging. Please see official radiology report.  No orders to display               Hans Phelps PA-C  Emergency Medicine  [facility]     Hans Phelps PA-C  01/08/22 1214

## 2022-01-09 NOTE — DISCHARGE INSTRUCTIONS
Please continue use the over-the-counter medications such as ibuprofen and Tylenol as needed for your aches and pains.  I have provided you a muscle relaxant and pain medication to use sparingly hopefully in the evenings to help you sleep.  If you develop worsening symptoms such as worsening fatigue or shortness of breath consider follow-up with your primary care or returning to the ED.  I do suspect that you are suffering from COVID-19, your test result as an outpatient on Wednesday will likely resolve soon.

## 2022-06-25 ENCOUNTER — HEALTH MAINTENANCE LETTER (OUTPATIENT)
Age: 47
End: 2022-06-25

## 2022-06-26 ENCOUNTER — APPOINTMENT (OUTPATIENT)
Dept: RADIOLOGY | Facility: CLINIC | Age: 47
End: 2022-06-26
Attending: EMERGENCY MEDICINE

## 2022-06-26 ENCOUNTER — HOSPITAL ENCOUNTER (EMERGENCY)
Facility: CLINIC | Age: 47
Discharge: HOME | End: 2022-06-26
Attending: EMERGENCY MEDICINE | Admitting: EMERGENCY MEDICINE

## 2022-06-26 VITALS
RESPIRATION RATE: 18 BRPM | BODY MASS INDEX: 44.3 KG/M2 | OXYGEN SATURATION: 96 % | HEIGHT: 63 IN | TEMPERATURE: 98.7 F | WEIGHT: 250 LBS | HEART RATE: 79 BPM | SYSTOLIC BLOOD PRESSURE: 142 MMHG | DIASTOLIC BLOOD PRESSURE: 79 MMHG

## 2022-06-26 DIAGNOSIS — R19.7 DIARRHEA, UNSPECIFIED TYPE: ICD-10-CM

## 2022-06-26 LAB
ALBUMIN UR-MCNC: NEGATIVE MG/DL
APPEARANCE UR: CLEAR
BILIRUB UR QL STRIP: NEGATIVE
COLOR UR AUTO: ABNORMAL
FLUAV RNA SPEC QL NAA+PROBE: NEGATIVE
FLUBV RNA RESP QL NAA+PROBE: NEGATIVE
GLUCOSE UR STRIP-MCNC: NEGATIVE MG/DL
HGB UR QL STRIP: ABNORMAL
HYALINE CASTS: 1 /LPF
KETONES UR STRIP-MCNC: NEGATIVE MG/DL
LEUKOCYTE ESTERASE UR QL STRIP: NEGATIVE
MUCOUS THREADS #/AREA URNS LPF: PRESENT /LPF
NITRATE UR QL: NEGATIVE
PH UR STRIP: 6 [PH] (ref 5–7)
RBC URINE: 6 /HPF
RSV RNA SPEC NAA+PROBE: NEGATIVE
SARS-COV-2 RNA RESP QL NAA+PROBE: NEGATIVE
SP GR UR STRIP: 1.02 (ref 1–1.03)
SQUAMOUS EPITHELIAL: 5 /HPF
UROBILINOGEN UR STRIP-MCNC: <2 MG/DL
WBC URINE: <1 /HPF

## 2022-06-26 PROCEDURE — C9803 HOPD COVID-19 SPEC COLLECT: HCPCS

## 2022-06-26 PROCEDURE — 99283 EMERGENCY DEPT VISIT LOW MDM: CPT | Mod: CS,25

## 2022-06-26 PROCEDURE — 87637 SARSCOV2&INF A&B&RSV AMP PRB: CPT | Performed by: EMERGENCY MEDICINE

## 2022-06-26 PROCEDURE — 71046 X-RAY EXAM CHEST 2 VIEWS: CPT

## 2022-06-26 PROCEDURE — 87506 IADNA-DNA/RNA PROBE TQ 6-11: CPT | Performed by: EMERGENCY MEDICINE

## 2022-06-26 PROCEDURE — 87493 C DIFF AMPLIFIED PROBE: CPT | Performed by: EMERGENCY MEDICINE

## 2022-06-26 PROCEDURE — 81001 URINALYSIS AUTO W/SCOPE: CPT | Performed by: EMERGENCY MEDICINE

## 2022-06-26 ASSESSMENT — ENCOUNTER SYMPTOMS
SORE THROAT: 0
VOMITING: 0
FLANK PAIN: 0
NAUSEA: 1
COUGH: 1
FEVER: 1
BACK PAIN: 0
MYALGIAS: 1
DIARRHEA: 1
ABDOMINAL PAIN: 0

## 2022-06-26 NOTE — ED TRIAGE NOTES
Fever, cough, and nausea x yesterday. Denies abdominal pain and dysuria, but thinks there might be blood in urine.

## 2022-06-26 NOTE — Clinical Note
Isha Valderrama was seen and treated in our emergency department on 6/26/2022.  She may return to work on 06/29/2022.       If you have any questions or concerns, please don't hesitate to call.      Elroy Villarreal MD

## 2022-06-27 LAB
C COLI+JEJUNI+LARI FUSA STL QL NAA+PROBE: NOT DETECTED
C DIFF TOX B STL QL: NEGATIVE
EC STX1 GENE STL QL NAA+PROBE: NOT DETECTED
EC STX2 GENE STL QL NAA+PROBE: NOT DETECTED
NOROV GI+II ORF1-ORF2 JNC STL QL NAA+PR: NOT DETECTED
RVA NSP5 STL QL NAA+PROBE: NOT DETECTED
SALMONELLA SP RPOD STL QL NAA+PROBE: NOT DETECTED
SHIGELLA SP+EIEC IPAH STL QL NAA+PROBE: NOT DETECTED
V CHOL+PARA RFBL+TRKH+TNAA STL QL NAA+PR: NOT DETECTED
Y ENTERO RECN STL QL NAA+PROBE: NOT DETECTED

## 2022-06-27 NOTE — DISCHARGE INSTRUCTIONS
Given your diarrhea in the setting of recent antibiotic therapy I do recommend that you drop off stool samples at our lab specifically for testing of C. difficile.  Rest and drink plenty of fluids.  Utilize your home Zofran as needed for nausea.  If you develop abdominal pain escalating symptoms or additional concern please return to the emergency department for repeat assessment.

## 2022-06-27 NOTE — ED NOTES
"Drank apple juice. Reports unable to have BM. \"I just gave me really stinky gas\". Provider updated.   "

## 2022-06-27 NOTE — ED PROVIDER NOTES
EMERGENCY DEPARTMENT ENCOUNTER      NAME: Isha Valderrama  AGE: 46 year old female  YOB: 1975  MRN: [id number]  EVALUATION DATE & TIME: 6/26/2022  6:59 PM     PCP: Dewayne Barrera    ED PROVIDER: FRANSICO Villarreal    Chief Complaint   Patient presents with     Fever     FINAL IMPRESSION:  1. Diarrhea, unspecified type        ED COURSE & MEDICAL DECISION MAKING:    Pertinent Labs & Imaging studies reviewed. (See chart for details)  46 year old female presents to the Emergency Department for evaluation of general malaise and repetitive diarrhea.  Patient did have antibiotic therapy for sinusitis and ear infection completing a course of antibiotics about a week ago.  Was on Augmentin and azithromycin I think prior to that.  Profuse watery diarrhea that she describes as foul-smelling yellow-greenish in nature no blood.  Temperatures at home.  On examination patient was well-appearing.  Cardiopulmonary examination unremarkable and abdominal examination was benign.  She had related a bit of a cough and in the setting of her fever I did recommend a chest x-ray.  Chest x-ray negative for acute process.  At this point I did not feel that laboratory testing or more imaging was indicated.  Considerations for infectious diarrhea viral versus bacterial at this point.  I think C. difficile given her recent antibiotic therapy is certainly a significant concern.  She was unable to provide stool samples here in the emergency department.  I do think patient at this point is appropriate for discharge home we will have her collect those samples on an outpatient basis.  I reviewed with her return precautions treatment plan and follow-up prior to discharge.      7:05 PM I met with the patient, obtained history, performed an initial exam, and discussed options and plan for diagnostics and treatment here in the ED.   Plan and all results were discussed  Time was taken to answer all questions. Patient and/or associated  parties expressed understanding and were agreeable to the treatment plan.  Warning signs and ER return precautions provided. Discharged with discharge instructions outlining plan for further care and follow up.     Diagnosis discussed with and explained to the patient and/or associated parties to their satisfaction.  All questions were answered at this time and they are in agreement with this diagnosis, treatment, and plan. No further emergent ED workup warranted at this time and patient did accept admission to the hospital.    0 minutes of critical care time     MEDICATIONS GIVEN IN THE EMERGENCY:  New Prescriptions    No medications on file       NEW PRESCRIPTIONS STARTED AT TODAY'S ER VISIT  Patient's Medications   New Prescriptions    No medications on file   Previous Medications    ACETAMINOPHEN (TYLENOL PO)        ALBUTEROL IN        FLUTICASONE PROPIONATE, INHAL, (FLOVENT IN)        IBUPROFEN (ADVIL,MOTRIN) 200 MG TABLET    Take 3 tablets (600 mg) by mouth every 8 hours as needed for mild pain    KETOROLAC (TORADOL) 10 MG TABLET    Take 1 tablet (10 mg) by mouth every 6 hours as needed for moderate pain    LORAZEPAM PO        MEDROXYPROGESTERONE ACETATE (DEPO-PROVERA IM)        OXYCODONE (ROXICODONE) 5 MG TABLET    Take 1 tablet (5 mg) by mouth every 6 hours as needed for pain    OXYCODONE-ACETAMINOPHEN (PERCOCET) 5-325 MG PER TABLET    Take 1 tablet by mouth every 6 hours as needed for moderate to severe pain    SERTRALINE HCL PO    Take by mouth daily   Modified Medications    No medications on file   Discontinued Medications    No medications on file          =================================================================    HPI    Patient information was obtained from: Patient    Use of Intrepreter: N/A        Isha Valderrama is a 46 year old female with a past medical history of kidney stones and Covid-19 positive in 12/21 , who presents with a fever and diarrhea.      The patient reports a couple  of days of a fever, nausea, and myalgias.  She reports a mild cough.  She had sudden onset of foul smelling, yellow diarrhea yesterday.  She reports ten episodes yesterday, which reduced to only a few episodes today after switching to liquid only today.  She reports some dark urine , which she is unsure if it contained blood or not.  She reports being diagnosed by her primary with a sinus and ear infection a few weeks ago. She was on a couple rounds of antibiotics, last Augmentin ( finished) .  She did have Covid-19 in 12/21 .  She otherwise denies any sore throat  , abdominal pain, back pain, flank pain, or other complaints at this time.                                      REVIEW OF SYSTEMS   Review of Systems   Constitutional: Positive for fever.   HENT: Negative for sore throat.    Respiratory: Positive for cough (mild).    Gastrointestinal: Positive for diarrhea (foul smelling, yellow ) and nausea. Negative for abdominal pain and vomiting.   Genitourinary: Negative for flank pain.   Musculoskeletal: Positive for myalgias. Negative for back pain.   All other systems reviewed and are negative.     PAST MEDICAL HISTORY:  Past Medical History:   Diagnosis Date     Anxiety      Kidney stone      Uncomplicated asthma     Covid-19 positive in 12/21    PAST SURGICAL HISTORY:  Past Surgical History:   Procedure Laterality Date     KIDNEY STONE SURGERY      Kidney Stone [place] (Dr. Ku)     KNEE SURGERY       ORTHOPEDIC SURGERY       OTHER SURGICAL HISTORY      bilateral ureter stents     TONSILLECTOMY       TONSILLECTOMY         ALLERGIES:  Allergies   Allergen Reactions     Compazine [Prochlorperazine] Other (See Comments)     Dystonic reaction     Morphine Itching       FAMILY HISTORY:  Family History   Problem Relation Age of Onset     Urolithiasis Father         recurrent     Urolithiasis Paternal Aunt         recurrent     Urolithiasis Paternal Grandmother         recurrent       SOCIAL HISTORY:   Social  "History     Socioeconomic History     Marital status: Single   Tobacco Use     Smoking status: Current Every Day Smoker     Packs/day: 1.00   Substance and Sexual Activity     Alcohol use: No     Drug use: No   Social History Narrative    Lives with family.       VITALS:  Patient Vitals for the past 24 hrs:   BP Temp Temp src Pulse Resp SpO2 Height Weight   06/26/22 1756 135/74 98.7  F (37.1  C) Oral 79 18 99 % 1.6 m (5' 3\") 113.4 kg (250 lb)       PHYSICAL EXAM    Constitutional:  Well developed, Well nourished, NAD  HENT:  Normocephalic, Atraumatic, Bilateral external ears normal, Oropharynx moist Nose normal.   Neck: Normal range of motion   Eyes: Conjunctiva normal, No discharge.   Respiratory:  Normal breath sounds, No respiratory distress, No wheezing.  No cough.  Cardiovascular:  Normal heart rate, Normal rhythm. No murmur appreciated.  Chest wall non-tender.  GI:  Soft, No tenderness, No masses, No flank tenderness.  No rebound or guarding.  : No CVA tenderness  Musculoskeletal: Full ROM.  No edema appreciated.  No cyanosis. Good ROM of major joints.  Integument:  Warm, Dry, No erythema, No rash.    Neurologic:  Alert & oriented.  No focal deficits appreciated.  Ambulatory.  Psychiatric:  Affect normal, Judgment normal, Mood normal.     LAB:  All pertinent labs reviewed and interpreted.  Results for orders placed or performed during the hospital encounter of 06/26/22   Chest XR,  PA & LAT    Impression    IMPRESSION:     No focal airspace disease. No pleural effusion or pneumothorax.    The cardiomediastinal silhouette is unremarkable.    Mild multilevel degenerative changes of the spine.   UA with Microscopic reflex to Culture    Specimen: Urine, Midstream   Result Value Ref Range    Color Urine Light Yellow Colorless, Straw, Light Yellow, Yellow    Appearance Urine Clear Clear    Glucose Urine Negative Negative mg/dL    Bilirubin Urine Negative Negative    Ketones Urine Negative Negative mg/dL    " Specific Gravity Urine 1.021 1.001 - 1.030    Blood Urine 0.1 mg/dL (A) Negative    pH Urine 6.0 5.0 - 7.0    Protein Albumin Urine Negative Negative mg/dL    Urobilinogen Urine <2.0 <2.0 mg/dL    Nitrite Urine Negative Negative    Leukocyte Esterase Urine Negative Negative    Mucus Urine Present (A) None Seen /LPF    RBC Urine 6 (H) <=2 /HPF    WBC Urine <1 <=5 /HPF    Squamous Epithelials Urine 5 (H) <=1 /HPF    Hyaline Casts Urine 1 <=2 /LPF   Symptomatic; Unknown Influenza A/B & SARS-CoV2 (COVID-19) Virus PCR Multiplex Nasopharyngeal    Specimen: Nasopharyngeal; Swab   Result Value Ref Range    Influenza A PCR Negative Negative    Influenza B PCR Negative Negative    RSV PCR Negative Negative    SARS CoV2 PCR Negative Negative       RADIOLOGY:  Reviewed all pertinent imaging. Please see official radiology report.  Chest XR,  PA & LAT   Final Result   IMPRESSION:       No focal airspace disease. No pleural effusion or pneumothorax.      The cardiomediastinal silhouette is unremarkable.      Mild multilevel degenerative changes of the spine.           I, Hans Morris, am serving as a scribe to document services personally performed by Dr. Villarreal based on my observation and the provider's statements to me. I, Elroy Villarreal MD attest that Hans Morris is acting in a scribe capacity, has observed my performance of the services and has documented them in accordance with my direction.    Elroy Villarreal M.D.  Emergency Medicine  [facility] EMERGENCY ROOM  [address] [id number] [phone]  Dept: [phone]                                Elroy Villarreal MD  06/26/22 2050

## 2022-11-19 ENCOUNTER — HEALTH MAINTENANCE LETTER (OUTPATIENT)
Age: 47
End: 2022-11-19

## 2023-06-09 ENCOUNTER — HOSPITAL ENCOUNTER (EMERGENCY)
Facility: CLINIC | Age: 48
Discharge: HOME | End: 2023-06-09
Attending: EMERGENCY MEDICINE | Admitting: EMERGENCY MEDICINE
Payer: COMMERCIAL

## 2023-06-09 ENCOUNTER — APPOINTMENT (OUTPATIENT)
Dept: CT IMAGING | Facility: CLINIC | Age: 48
End: 2023-06-09
Attending: EMERGENCY MEDICINE
Payer: COMMERCIAL

## 2023-06-09 VITALS
DIASTOLIC BLOOD PRESSURE: 62 MMHG | OXYGEN SATURATION: 95 % | HEIGHT: 63 IN | TEMPERATURE: 97 F | WEIGHT: 250 LBS | BODY MASS INDEX: 44.3 KG/M2 | RESPIRATION RATE: 18 BRPM | SYSTOLIC BLOOD PRESSURE: 148 MMHG | HEART RATE: 92 BPM

## 2023-06-09 DIAGNOSIS — N20.1 URETEROLITHIASIS: ICD-10-CM

## 2023-06-09 DIAGNOSIS — N13.2 HYDRONEPHROSIS WITH URINARY OBSTRUCTION DUE TO URETERAL CALCULUS: ICD-10-CM

## 2023-06-09 PROBLEM — F33.1 MODERATE EPISODE OF RECURRENT MAJOR DEPRESSIVE DISORDER (H): Status: ACTIVE | Noted: 2018-03-29

## 2023-06-09 LAB
ABO/RH(D): NORMAL
ALBUMIN SERPL-MCNC: 4.3 G/DL (ref 3.5–5)
ALBUMIN UR-MCNC: 50 MG/DL
ALP SERPL-CCNC: 96 U/L (ref 45–120)
ALT SERPL W P-5'-P-CCNC: 16 U/L (ref 0–45)
ANION GAP SERPL CALCULATED.3IONS-SCNC: 9 MMOL/L (ref 5–18)
ANTIBODY SCREEN: NEGATIVE
APPEARANCE UR: ABNORMAL
AST SERPL W P-5'-P-CCNC: 19 U/L (ref 0–40)
BACTERIA #/AREA URNS HPF: ABNORMAL /HPF
BASOPHILS # BLD AUTO: 0.1 10E3/UL (ref 0–0.2)
BASOPHILS NFR BLD AUTO: 1 %
BILIRUB SERPL-MCNC: 0.6 MG/DL (ref 0–1)
BILIRUB UR QL STRIP: NEGATIVE
BUN SERPL-MCNC: 9 MG/DL (ref 8–22)
C REACTIVE PROTEIN LHE: 0.5 MG/DL (ref 0–?)
CALCIUM SERPL-MCNC: 10.3 MG/DL (ref 8.5–10.5)
CAOX CRY #/AREA URNS HPF: ABNORMAL /HPF
CHLORIDE BLD-SCNC: 104 MMOL/L (ref 98–107)
CO2 SERPL-SCNC: 26 MMOL/L (ref 22–31)
COLOR UR AUTO: YELLOW
CREAT SERPL-MCNC: 0.72 MG/DL (ref 0.6–1.1)
EOSINOPHIL # BLD AUTO: 0.1 10E3/UL (ref 0–0.7)
EOSINOPHIL NFR BLD AUTO: 0 %
ERYTHROCYTE [DISTWIDTH] IN BLOOD BY AUTOMATED COUNT: 13.3 % (ref 10–15)
GFR SERPL CREATININE-BSD FRML MDRD: >90 ML/MIN/1.73M2
GLUCOSE BLD-MCNC: 107 MG/DL (ref 70–125)
GLUCOSE UR STRIP-MCNC: NEGATIVE MG/DL
HCG UR QL: NEGATIVE
HCT VFR BLD AUTO: 49.2 % (ref 35–47)
HGB BLD-MCNC: 16.4 G/DL (ref 11.7–15.7)
HGB UR QL STRIP: ABNORMAL
HOLD SPECIMEN: NORMAL
IMM GRANULOCYTES # BLD: 0.1 10E3/UL
IMM GRANULOCYTES NFR BLD: 0 %
KETONES UR STRIP-MCNC: ABNORMAL MG/DL
LEUKOCYTE ESTERASE UR QL STRIP: ABNORMAL
LIPASE SERPL-CCNC: 16 U/L (ref 0–52)
LYMPHOCYTES # BLD AUTO: 3.6 10E3/UL (ref 0.8–5.3)
LYMPHOCYTES NFR BLD AUTO: 24 %
MCH RBC QN AUTO: 31.8 PG (ref 26.5–33)
MCHC RBC AUTO-ENTMCNC: 33.3 G/DL (ref 31.5–36.5)
MCV RBC AUTO: 95 FL (ref 78–100)
MONOCYTES # BLD AUTO: 0.8 10E3/UL (ref 0–1.3)
MONOCYTES NFR BLD AUTO: 5 %
MUCOUS THREADS #/AREA URNS LPF: PRESENT /LPF
NEUTROPHILS # BLD AUTO: 10.3 10E3/UL (ref 1.6–8.3)
NEUTROPHILS NFR BLD AUTO: 70 %
NITRATE UR QL: NEGATIVE
NRBC # BLD AUTO: 0 10E3/UL
NRBC BLD AUTO-RTO: 0 /100
PH UR STRIP: 6.5 [PH] (ref 5–7)
PLATELET # BLD AUTO: 426 10E3/UL (ref 150–450)
POTASSIUM BLD-SCNC: 4.3 MMOL/L (ref 3.5–5)
PROT SERPL-MCNC: 7.8 G/DL (ref 6–8)
RBC # BLD AUTO: 5.16 10E6/UL (ref 3.8–5.2)
RBC URINE: >182 /HPF
SODIUM SERPL-SCNC: 139 MMOL/L (ref 136–145)
SP GR UR STRIP: 1.03 (ref 1–1.03)
SPECIMEN EXPIRATION DATE: NORMAL
SQUAMOUS EPITHELIAL: 11 /HPF
TROPONIN I SERPL-MCNC: <0.01 NG/ML (ref 0–0.29)
UROBILINOGEN UR STRIP-MCNC: 2 MG/DL
WBC # BLD AUTO: 14.9 10E3/UL (ref 4–11)
WBC URINE: 6 /HPF

## 2023-06-09 PROCEDURE — 96374 THER/PROPH/DIAG INJ IV PUSH: CPT | Mod: 59

## 2023-06-09 PROCEDURE — 86901 BLOOD TYPING SEROLOGIC RH(D): CPT | Performed by: EMERGENCY MEDICINE

## 2023-06-09 PROCEDURE — 81025 URINE PREGNANCY TEST: CPT | Performed by: EMERGENCY MEDICINE

## 2023-06-09 PROCEDURE — 250N000011 HC RX IP 250 OP 636: Performed by: EMERGENCY MEDICINE

## 2023-06-09 PROCEDURE — 74174 CTA ABD&PLVS W/CONTRAST: CPT

## 2023-06-09 PROCEDURE — 96375 TX/PRO/DX INJ NEW DRUG ADDON: CPT

## 2023-06-09 PROCEDURE — 86850 RBC ANTIBODY SCREEN: CPT | Performed by: EMERGENCY MEDICINE

## 2023-06-09 PROCEDURE — 81003 URINALYSIS AUTO W/O SCOPE: CPT | Performed by: EMERGENCY MEDICINE

## 2023-06-09 PROCEDURE — 84484 ASSAY OF TROPONIN QUANT: CPT | Performed by: EMERGENCY MEDICINE

## 2023-06-09 PROCEDURE — 99285 EMERGENCY DEPT VISIT HI MDM: CPT | Mod: 25

## 2023-06-09 PROCEDURE — 85025 COMPLETE CBC W/AUTO DIFF WBC: CPT | Performed by: EMERGENCY MEDICINE

## 2023-06-09 PROCEDURE — 86140 C-REACTIVE PROTEIN: CPT | Performed by: EMERGENCY MEDICINE

## 2023-06-09 PROCEDURE — 80053 COMPREHEN METABOLIC PANEL: CPT | Performed by: EMERGENCY MEDICINE

## 2023-06-09 PROCEDURE — 250N000013 HC RX MED GY IP 250 OP 250 PS 637: Performed by: EMERGENCY MEDICINE

## 2023-06-09 PROCEDURE — 83690 ASSAY OF LIPASE: CPT | Performed by: EMERGENCY MEDICINE

## 2023-06-09 PROCEDURE — 36415 COLL VENOUS BLD VENIPUNCTURE: CPT | Performed by: EMERGENCY MEDICINE

## 2023-06-09 RX ORDER — KETOROLAC TROMETHAMINE 15 MG/ML
15 INJECTION, SOLUTION INTRAMUSCULAR; INTRAVENOUS ONCE
Status: COMPLETED | OUTPATIENT
Start: 2023-06-09 | End: 2023-06-09

## 2023-06-09 RX ORDER — DIMENHYDRINATE 50 MG
50 TABLET ORAL EVERY 6 HOURS PRN
Qty: 28 TABLET | Refills: 0 | Status: SHIPPED | OUTPATIENT
Start: 2023-06-09 | End: 2023-06-16

## 2023-06-09 RX ORDER — ONDANSETRON 2 MG/ML
4 INJECTION INTRAMUSCULAR; INTRAVENOUS ONCE
Status: COMPLETED | OUTPATIENT
Start: 2023-06-09 | End: 2023-06-09

## 2023-06-09 RX ORDER — IOPAMIDOL 755 MG/ML
100 INJECTION, SOLUTION INTRAVASCULAR ONCE
Status: COMPLETED | OUTPATIENT
Start: 2023-06-09 | End: 2023-06-09

## 2023-06-09 RX ORDER — OXYCODONE HYDROCHLORIDE 5 MG/1
5 TABLET ORAL EVERY 4 HOURS PRN
Qty: 12 TABLET | Refills: 0 | Status: SHIPPED | OUTPATIENT
Start: 2023-06-09 | End: 2023-06-14

## 2023-06-09 RX ORDER — ONDANSETRON 2 MG/ML
8 INJECTION INTRAMUSCULAR; INTRAVENOUS ONCE
Status: COMPLETED | OUTPATIENT
Start: 2023-06-09 | End: 2023-06-09

## 2023-06-09 RX ORDER — DIMENHYDRINATE 50 MG
50 TABLET ORAL AT BEDTIME
Qty: 7 TABLET | Refills: 0 | Status: SHIPPED | OUTPATIENT
Start: 2023-06-09 | End: 2023-06-16

## 2023-06-09 RX ORDER — ACETAMINOPHEN 325 MG/1
975 TABLET ORAL ONCE
Status: COMPLETED | OUTPATIENT
Start: 2023-06-09 | End: 2023-06-09

## 2023-06-09 RX ORDER — ONDANSETRON 4 MG/1
4-8 TABLET, ORALLY DISINTEGRATING ORAL EVERY 8 HOURS PRN
Qty: 20 TABLET | Refills: 0 | Status: SHIPPED | OUTPATIENT
Start: 2023-06-09 | End: 2023-06-12

## 2023-06-09 RX ORDER — ACETAMINOPHEN 500 MG
1000 TABLET ORAL EVERY 6 HOURS
Qty: 56 TABLET | Refills: 0 | Status: SHIPPED | OUTPATIENT
Start: 2023-06-09 | End: 2023-06-16

## 2023-06-09 RX ADMIN — ONDANSETRON 4 MG: 2 INJECTION INTRAMUSCULAR; INTRAVENOUS at 12:19

## 2023-06-09 RX ADMIN — ONDANSETRON 4 MG: 2 INJECTION INTRAMUSCULAR; INTRAVENOUS at 12:15

## 2023-06-09 RX ADMIN — HYDROMORPHONE HYDROCHLORIDE 1 MG: 1 INJECTION, SOLUTION INTRAMUSCULAR; INTRAVENOUS; SUBCUTANEOUS at 12:45

## 2023-06-09 RX ADMIN — KETOROLAC TROMETHAMINE 15 MG: 15 INJECTION, SOLUTION INTRAMUSCULAR; INTRAVENOUS at 13:48

## 2023-06-09 RX ADMIN — IOPAMIDOL 100 ML: 755 INJECTION, SOLUTION INTRAVENOUS at 12:58

## 2023-06-09 RX ADMIN — ACETAMINOPHEN 975 MG: 325 TABLET ORAL at 13:49

## 2023-06-09 RX ADMIN — HYDROMORPHONE HYDROCHLORIDE 1 MG: 1 INJECTION, SOLUTION INTRAMUSCULAR; INTRAVENOUS; SUBCUTANEOUS at 12:20

## 2023-06-09 ASSESSMENT — ACTIVITIES OF DAILY LIVING (ADL): ADLS_ACUITY_SCORE: 35

## 2023-06-09 NOTE — ED TRIAGE NOTES
"Pt arrives to ED with c/o sudden onset of severe abdominal and back pain. Rates 10/10. Pt restless and sweating. Nausea and vomiting. No diarrhea or urinary symptoms. Hx of kidney stones but pt states its not a kidney stone \"this is different\" also a hx of gallstones.      Triage Assessment     Row Name 06/09/23 3949       Triage Assessment (Adult)    Airway WDL WDL       Respiratory WDL    Respiratory WDL WDL       Skin Circulation/Temperature WDL    Skin Circulation/Temperature WDL WDL       Cardiac WDL    Cardiac WDL WDL       Peripheral/Neurovascular WDL    Peripheral Neurovascular WDL WDL       Cognitive/Neuro/Behavioral WDL    Cognitive/Neuro/Behavioral WDL WDL              "

## 2023-06-09 NOTE — ED PROVIDER NOTES
EMERGENCY DEPARTMENT ENCOUNTER     NAME: Isha Valderrama   AGE: 47 year old female   YOB: 1975   MRN: [id number]   EVALUATION DATE & TIME: No admission date for patient encounter.   PCP: Dewayne Barrera     Chief Complaint   Patient presents with     Abdominal Pain     Nausea & Vomiting   :    FINAL IMPRESSION       1. Ureterolithiasis    2. Hydronephrosis with urinary obstruction due to ureteral calculus           ED COURSE & MEDICAL DECISION MAKING      Pertinent Labs & Imaging studies reviewed. (See chart for details)   47 year old female  presents to the Emergency Department for evaluation of diffuse abdominal and back pain, worse on the left side. Initial Vitals Reviewed. Initial exam notable for patient who was diaphoretic, extremely uncomfortable, hypertensive, with diffuse left CVA and diffuse abdominal tenderness, actively vomiting into an emesis bag.  She does have a history of nephrolithiasis but specifically stated this felt similar than previous kidney stone.  Therefore, given her appearance I also considered something like dissection, AAA, other severe intra-abdominal pathology like a bowel obstruction as well as nephrolithiasis, pyelonephritis.  Labs show leukocytosis of 14 but fortunately her renal function remains normal and her urinalysis shows hematuria but without signs of infection.  UPT is negative.  She was treated with IV Zofran and a couple of doses of Dilaudid without significant improvement, and after imaging shows hydronephrosis and hydroureter secondary to a proximal ureteral stone and the diagnosis was made, I treated with Toradol which improved her pain significantly.  She is comfortable with the discharge plan including prescription medications, [facility] follow-up, urine strainer, and return precautions.        12:08 PM I met with the patient to gather history and to perform my initial exam. I discussed the plan for care while in the Emergency Department.   2:17 PM  Checked in on and updated patient. I discussed the plan for discharge with the patient, and patient is agreeable.  We discussed supportive cares at home and reasons for return to the ER including new or worsening symptoms - all questions and concerns addressed.  Patient to be discharged by RN.      At the conclusion of the encounter I discussed the results of all of the tests and the disposition. The questions were answered. The patient or family acknowledged understanding and was agreeable with the care plan.     0 minutes critical care time, see procedure note below for details if relevant    Medical Decision Making    History:    Supplemental history from: N/A    External Record(s) reviewed: history, ED visit for flank pain 9/22/21    Work Up:    Chart documentation includes differential considered and any EKGs or imaging interpreted by provider.    In additional to work up documented, I considered the following work up: Documented in chart, if applicable.    External consultation:    Discussion of management with another provider: Documented in chart, if applicable    Complicating factors:    Care impacted by chronic illness:mental health    Care affected by social determinants of health: access to care    Disposition considerations: Discharge. I prescribed additional prescription strength medication(s) as charted. I considered admission, but ultimately discharged patient With reassuring diagnosis, clinical improvement, and outpatient follow-up plan.      MEDICATIONS GIVEN IN THE EMERGENCY:   Medications   ondansetron (ZOFRAN) injection 4 mg (has no administration in time range)   HYDROmorphone (DILAUDID) injection 1 mg (has no administration in time range)   ondansetron (ZOFRAN) injection 8 mg (has no administration in time range)      NEW PRESCRIPTIONS STARTED AT TODAY'S ER VISIT   New Prescriptions    No medications on file     ================================================================   HISTORY OF  "PRESENT ILLNESS       Patient information was obtained from: Patient   Use of Intrepreter: N/A   Isha Valderrama is a 47 year old female with history of asthma and kidney stones who presents for abdominal pain.    Patient reports a sudden onset of generalized abdominal pain and left flank pain starting 1 hour ago with associated nausea and vomiting. Patient describes the pain as \"feeling like labor\". Patient endorses a history of kidney stones, but states this feels different. Patient denies any other complaints at this time.      ================================================================        PAST HISTORY     PAST MEDICAL HISTORY:   Past Medical History:   Diagnosis Date     Anxiety      Kidney stone      Uncomplicated asthma       PAST SURGICAL HISTORY:   Past Surgical History:   Procedure Laterality Date     KIDNEY STONE SURGERY      Kidney Stone [facility] (Dr. Ku)     KNEE SURGERY       ORTHOPEDIC SURGERY       OTHER SURGICAL HISTORY      bilateral ureter stents     TONSILLECTOMY       TONSILLECTOMY        CURRENT MEDICATIONS:   Acetaminophen (TYLENOL PO)  ALBUTEROL IN  Fluticasone Propionate, Inhal, (FLOVENT IN)  ibuprofen (ADVIL,MOTRIN) 200 MG tablet  ketorolac (TORADOL) 10 MG tablet  LORAZEPAM PO  MedroxyPROGESTERone Acetate (DEPO-PROVERA IM)  oxyCODONE (ROXICODONE) 5 MG tablet  oxyCODONE-acetaminophen (PERCOCET) 5-325 MG per tablet  SERTRALINE HCL PO      ALLERGIES:   Allergies   Allergen Reactions     Alprazolam Other (See Comments)     Like a zombie   Felt like a Zombi  Like a zombie   Felt like a Zombi       Compazine [Prochlorperazine] Other (See Comments)     Dystonic reaction     Morphine Itching     Hydroxyzine Anxiety     Other reaction(s): Other, see comments  Making her feel like her skin in crawling out         FAMILY HISTORY:   Family History   Problem Relation Age of Onset     Urolithiasis Father         recurrent     Urolithiasis Paternal Aunt         recurrent     Urolithiasis " "Paternal Grandmother         recurrent      SOCIAL HISTORY:   Social History     Socioeconomic History     Marital status: Single   Tobacco Use     Smoking status: Every Day     Packs/day: 1.00     Types: Cigarettes   Substance and Sexual Activity     Alcohol use: No     Drug use: No   Social History Narrative    Lives with family.        VITALS  Patient Vitals for the past 24 hrs:   BP Temp Temp src Pulse Resp SpO2 Height Weight   06/09/23 1202 (!) 171/105 97  F (36.1  C) Temporal 70 16 99 % 1.6 m (5' 3\") 113.4 kg (250 lb)        ================================================================    PHYSICAL EXAM     VITAL SIGNS: BP (!) 171/105   Pulse 70   Temp 97  F (36.1  C) (Temporal)   Resp 16   Ht 1.6 m (5' 3\")   Wt 113.4 kg (250 lb)   LMP 05/24/2023 (Approximate)   SpO2 99%   BMI 44.29 kg/m     Constitutional:  Awake, no acute distress, uncomfortable appearing, diaphoretic, retching into emesis bag  HENT:  Atraumatic, oropharynx without exudate or erythema, membranes moist  Lymph:  No adenopathy  Eyes: EOM intact, PERRL, no injection  Neck: Supple  Respiratory:  Clear to auscultation bilaterally, no wheezes or crackles   Cardiovascular:  Regular rate and rhythm, single S1 and S2   GI:  Soft, nontender, nondistended, no rebound or guarding   Musculoskeletal:  Moves all extremities, no lower extremity edema, no deformities    Skin:  Warm, dry  Neurologic:  Alert and oriented x3, no focal deficits noted       ================================================================  LAB       All pertinent labs reviewed and interpreted.   Labs Ordered and Resulted from Time of ED Arrival to Time of ED Departure   CBC WITH PLATELETS AND DIFFERENTIAL - Abnormal       Result Value    WBC Count 14.9 (*)     RBC Count 5.16      Hemoglobin 16.4 (*)     Hematocrit 49.2 (*)     MCV 95      MCH 31.8      MCHC 33.3      RDW 13.3      Platelet Count 426      % Neutrophils 70      % Lymphocytes 24      % Monocytes 5      % " Eosinophils 0      % Basophils 1      % Immature Granulocytes 0      NRBCs per 100 WBC 0      Absolute Neutrophils 10.3 (*)     Absolute Lymphocytes 3.6      Absolute Monocytes 0.8      Absolute Eosinophils 0.1      Absolute Basophils 0.1      Absolute Immature Granulocytes 0.1      Absolute NRBCs 0.0     ROUTINE UA WITH MICROSCOPIC REFLEX TO CULTURE - Abnormal    Color Urine Yellow      Appearance Urine Turbid (*)     Glucose Urine Negative      Bilirubin Urine Negative      Ketones Urine Trace (*)     Specific Gravity Urine 1.029      Blood Urine >1.0 mg/dL (*)     pH Urine 6.5      Protein Albumin Urine 50 (*)     Urobilinogen Urine 2.0 (*)     Nitrite Urine Negative      Leukocyte Esterase Urine 25 Naomi/uL (*)     Bacteria Urine Few (*)     Mucus Urine Present (*)     Calcium Oxalate Crystals Urine Many (*)     RBC Urine >182 (*)     WBC Urine 6 (*)     Squamous Epithelials Urine 11 (*)    COMPREHENSIVE METABOLIC PANEL - Normal    Sodium 139      Potassium 4.3      Chloride 104      Carbon Dioxide (CO2) 26      Anion Gap 9      Urea Nitrogen 9      Creatinine 0.72      Calcium 10.3      Glucose 107      Alkaline Phosphatase 96      AST 19      ALT 16      Protein Total 7.8      Albumin 4.3      Bilirubin Total 0.6      GFR Estimate >90     LIPASE - Normal    Lipase 16     TROPONIN I - Normal    Troponin I <0.01     CRP INFLAMMATION - Normal    CRP 0.5     HCG QUALITATIVE URINE - Normal    hCG Urine Qualitative Negative     TYPE AND SCREEN, ADULT    ABO/RH(D) O POS      Antibody Screen Negative      SPECIMEN EXPIRATION DATE [id number]     ABO/RH TYPE AND SCREEN        ===============================================================  RADIOLOGY       Reviewed all pertinent imaging. Please see official radiology report.   CTA Chest Abdomen Pelvis w Contrast   Final Result   Addendum (preliminary) 1 of 1   IMPRESSION:   3. High and low-attenuation hepatic lesions are incompletely assessed;    these could be  assessed with MRI.      Final   IMPRESSION:   1.  No acute aortic syndrome. No PE.   2.  Left hydronephrosis and hydroureter extending to the proximal ureter, where there is a 2 mm calculus.              ================================================================  PROCEDURES         I, Sahra Garsia, am serving as a scribe to document services personally performed by Dr. Neff based on my observation and the provider's statements to me. I, Jailene Neff MD attest that Sahra Garsia is acting in a scribe capacity, has observed my performance of the services and has documented them in accordance with my direction.   Jailene Neff M.D.   Emergency Medicine   [facility] EMERGENCY ROOM  [address] [facility] [id number]  [phone]  Dept: [phone]        Jailene Neff MD  06/09/23 [id number]

## 2023-06-13 ENCOUNTER — TELEPHONE (OUTPATIENT)
Dept: UROLOGY | Facility: CLINIC | Age: 48
End: 2023-06-13
Payer: COMMERCIAL

## 2023-06-13 NOTE — TELEPHONE ENCOUNTER
This encounter is being sent to inform the clinic that this patient has a referral from Jailene Neff MD for the diagnoses of Kidney Stones   and has requested that this patient be seen within 1-2 days . Based on the availability of our provider(s), we are unable to accommodate this request.      Were all sites offered this patient?  Yes      Does scheduling algorithm request to schedule next available?  Patient has been scheduled for the first available opening with [facility] on 8/9/23.  We have informed the patient that the clinic will review their referral and reach out if a sooner appointment is medically necessary.

## 2023-06-13 NOTE — TELEPHONE ENCOUNTER
[facility] Call Center    Phone Message    May a detailed message be left on voicemail: yes     Reason for Call: Medication Refill Request    Has the patient contacted the pharmacy for the refill? Yes   Name of medication being requested: oxyCODONE (ROXICODONE) 5 MG tablet     Provider who prescribed the medication: Jailene Neff MD  Pharmacy:  [address] [place] [id number]  Date medication is needed: ASAP     Pt has upcoming appt for kidney stones with [facility] on 8/9/23. Pt is in pain and is calling to get a presciption if possible. Please call Isha [phone]. Thank you    Action Taken: Other: Uro    Travel Screening: Not Applicable

## 2023-06-14 ENCOUNTER — HOSPITAL ENCOUNTER (EMERGENCY)
Facility: CLINIC | Age: 48
Discharge: HOME | End: 2023-06-14
Attending: EMERGENCY MEDICINE | Admitting: EMERGENCY MEDICINE
Payer: COMMERCIAL

## 2023-06-14 VITALS
SYSTOLIC BLOOD PRESSURE: 134 MMHG | WEIGHT: 250 LBS | HEIGHT: 63 IN | HEART RATE: 80 BPM | OXYGEN SATURATION: 93 % | DIASTOLIC BLOOD PRESSURE: 70 MMHG | BODY MASS INDEX: 44.3 KG/M2

## 2023-06-14 DIAGNOSIS — N20.1 CALCULUS OF URETER: ICD-10-CM

## 2023-06-14 LAB
ALBUMIN SERPL-MCNC: 3.7 G/DL (ref 3.5–5)
ALBUMIN UR-MCNC: NEGATIVE MG/DL
ALP SERPL-CCNC: 73 U/L (ref 45–120)
ALT SERPL W P-5'-P-CCNC: 15 U/L (ref 0–45)
ANION GAP SERPL CALCULATED.3IONS-SCNC: 6 MMOL/L (ref 5–18)
APPEARANCE UR: CLEAR
AST SERPL W P-5'-P-CCNC: 19 U/L (ref 0–40)
BASOPHILS # BLD AUTO: 0.1 10E3/UL (ref 0–0.2)
BASOPHILS NFR BLD AUTO: 1 %
BILIRUB SERPL-MCNC: 0.5 MG/DL (ref 0–1)
BILIRUB UR QL STRIP: NEGATIVE
BUN SERPL-MCNC: 8 MG/DL (ref 8–22)
C REACTIVE PROTEIN LHE: 2.5 MG/DL (ref 0–?)
CALCIUM SERPL-MCNC: 9.3 MG/DL (ref 8.5–10.5)
CHLORIDE BLD-SCNC: 102 MMOL/L (ref 98–107)
CO2 SERPL-SCNC: 30 MMOL/L (ref 22–31)
COLOR UR AUTO: ABNORMAL
CREAT SERPL-MCNC: 0.67 MG/DL (ref 0.6–1.1)
EOSINOPHIL # BLD AUTO: 0.2 10E3/UL (ref 0–0.7)
EOSINOPHIL NFR BLD AUTO: 1 %
ERYTHROCYTE [DISTWIDTH] IN BLOOD BY AUTOMATED COUNT: 13.5 % (ref 10–15)
GFR SERPL CREATININE-BSD FRML MDRD: >90 ML/MIN/1.73M2
GLUCOSE BLD-MCNC: 99 MG/DL (ref 70–125)
GLUCOSE UR STRIP-MCNC: NEGATIVE MG/DL
HCT VFR BLD AUTO: 44.5 % (ref 35–47)
HGB BLD-MCNC: 15.3 G/DL (ref 11.7–15.7)
HGB UR QL STRIP: ABNORMAL
HOLD SPECIMEN: NORMAL
IMM GRANULOCYTES # BLD: 0 10E3/UL
IMM GRANULOCYTES NFR BLD: 0 %
KETONES UR STRIP-MCNC: NEGATIVE MG/DL
LEUKOCYTE ESTERASE UR QL STRIP: NEGATIVE
LYMPHOCYTES # BLD AUTO: 2.5 10E3/UL (ref 0.8–5.3)
LYMPHOCYTES NFR BLD AUTO: 19 %
MCH RBC QN AUTO: 31.9 PG (ref 26.5–33)
MCHC RBC AUTO-ENTMCNC: 34.4 G/DL (ref 31.5–36.5)
MCV RBC AUTO: 93 FL (ref 78–100)
MONOCYTES # BLD AUTO: 0.9 10E3/UL (ref 0–1.3)
MONOCYTES NFR BLD AUTO: 7 %
MUCOUS THREADS #/AREA URNS LPF: PRESENT /LPF
NEUTROPHILS # BLD AUTO: 9.3 10E3/UL (ref 1.6–8.3)
NEUTROPHILS NFR BLD AUTO: 72 %
NITRATE UR QL: NEGATIVE
NRBC # BLD AUTO: 0 10E3/UL
NRBC BLD AUTO-RTO: 0 /100
PH UR STRIP: 7.5 [PH] (ref 5–7)
PLATELET # BLD AUTO: 445 10E3/UL (ref 150–450)
POTASSIUM BLD-SCNC: 4.3 MMOL/L (ref 3.5–5)
PROT SERPL-MCNC: 6.9 G/DL (ref 6–8)
RBC # BLD AUTO: 4.79 10E6/UL (ref 3.8–5.2)
RBC URINE: 6 /HPF
SODIUM SERPL-SCNC: 138 MMOL/L (ref 136–145)
SP GR UR STRIP: 1.01 (ref 1–1.03)
SQUAMOUS EPITHELIAL: 1 /HPF
UROBILINOGEN UR STRIP-MCNC: <2 MG/DL
WBC # BLD AUTO: 13 10E3/UL (ref 4–11)
WBC URINE: <1 /HPF

## 2023-06-14 PROCEDURE — 81001 URINALYSIS AUTO W/SCOPE: CPT | Performed by: EMERGENCY MEDICINE

## 2023-06-14 PROCEDURE — 96361 HYDRATE IV INFUSION ADD-ON: CPT

## 2023-06-14 PROCEDURE — 96374 THER/PROPH/DIAG INJ IV PUSH: CPT

## 2023-06-14 PROCEDURE — 258N000003 HC RX IP 258 OP 636: Performed by: EMERGENCY MEDICINE

## 2023-06-14 PROCEDURE — 86140 C-REACTIVE PROTEIN: CPT | Performed by: EMERGENCY MEDICINE

## 2023-06-14 PROCEDURE — 80053 COMPREHEN METABOLIC PANEL: CPT | Performed by: EMERGENCY MEDICINE

## 2023-06-14 PROCEDURE — 96376 TX/PRO/DX INJ SAME DRUG ADON: CPT

## 2023-06-14 PROCEDURE — 250N000011 HC RX IP 250 OP 636: Performed by: EMERGENCY MEDICINE

## 2023-06-14 PROCEDURE — 85025 COMPLETE CBC W/AUTO DIFF WBC: CPT | Performed by: EMERGENCY MEDICINE

## 2023-06-14 PROCEDURE — 36415 COLL VENOUS BLD VENIPUNCTURE: CPT | Performed by: EMERGENCY MEDICINE

## 2023-06-14 PROCEDURE — 250N000013 HC RX MED GY IP 250 OP 250 PS 637: Performed by: EMERGENCY MEDICINE

## 2023-06-14 PROCEDURE — 99284 EMERGENCY DEPT VISIT MOD MDM: CPT | Mod: 25

## 2023-06-14 PROCEDURE — 96375 TX/PRO/DX INJ NEW DRUG ADDON: CPT

## 2023-06-14 RX ORDER — OXYCODONE HYDROCHLORIDE 5 MG/1
5 TABLET ORAL EVERY 4 HOURS PRN
Qty: 12 TABLET | Refills: 0 | Status: SHIPPED | OUTPATIENT
Start: 2023-06-14 | End: 2023-10-02

## 2023-06-14 RX ORDER — OXYCODONE AND ACETAMINOPHEN 5; 325 MG/1; MG/1
2 TABLET ORAL ONCE
Status: COMPLETED | OUTPATIENT
Start: 2023-06-14 | End: 2023-06-14

## 2023-06-14 RX ORDER — KETOROLAC TROMETHAMINE 15 MG/ML
15 INJECTION, SOLUTION INTRAMUSCULAR; INTRAVENOUS ONCE
Status: COMPLETED | OUTPATIENT
Start: 2023-06-14 | End: 2023-06-14

## 2023-06-14 RX ADMIN — HYDROMORPHONE HYDROCHLORIDE 1 MG: 1 INJECTION, SOLUTION INTRAMUSCULAR; INTRAVENOUS; SUBCUTANEOUS at 15:59

## 2023-06-14 RX ADMIN — OXYCODONE HYDROCHLORIDE AND ACETAMINOPHEN 2 TABLET: 5; 325 TABLET ORAL at 15:35

## 2023-06-14 RX ADMIN — HYDROMORPHONE HYDROCHLORIDE 1 MG: 1 INJECTION, SOLUTION INTRAMUSCULAR; INTRAVENOUS; SUBCUTANEOUS at 17:29

## 2023-06-14 RX ADMIN — KETOROLAC TROMETHAMINE 15 MG: 15 INJECTION, SOLUTION INTRAMUSCULAR; INTRAVENOUS at 14:46

## 2023-06-14 RX ADMIN — SODIUM CHLORIDE 500 ML: 9 INJECTION, SOLUTION INTRAVENOUS at 14:43

## 2023-06-14 ASSESSMENT — ENCOUNTER SYMPTOMS
FLANK PAIN: 1
ADENOPATHY: 1

## 2023-06-14 ASSESSMENT — ACTIVITIES OF DAILY LIVING (ADL): ADLS_ACUITY_SCORE: 35

## 2023-06-14 NOTE — TELEPHONE ENCOUNTER
Message left for patient to let her know that we cannot fill this medication until she is seen, she should contact her pcp.  Also can move up patient's appointment to 6/19 await return call.  Aby Riley RN

## 2023-06-14 NOTE — ED NOTES
Pt agrees with discharge instructions. Pt informed not to drive after receiving the IV pain medications. Pt agreed, stating that she is going to get food at the hospital cafeteria while waiting for her ride to arrive. Pt knows when to return. Will  prescriptions and take as prescribed.

## 2023-06-14 NOTE — ED TRIAGE NOTES
Patient with KNOWN kidney stone was seen last Friday 6/9/2023.  [facility] did not get back to patient until today and said that they could prescribe more medications, but that pain was still so out of control that she should be seen again in the emergency department.    NOW also having stiff neck and inflamed lymph nodes in the back.    Having hot and cold flashes.  Did have influenza a few weeks ago and has residual cough.   Triage Assessment     Row Name 06/14/23 [id number]       Triage Assessment (Adult)    Airway WDL WDL       Respiratory WDL    Respiratory WDL WDL       Skin Circulation/Temperature WDL    Skin Circulation/Temperature WDL WDL       Cardiac WDL    Cardiac WDL WDL       Peripheral/Neurovascular WDL    Peripheral Neurovascular WDL WDL       Cognitive/Neuro/Behavioral WDL    Cognitive/Neuro/Behavioral WDL WDL

## 2023-06-14 NOTE — ED PROVIDER NOTES
Emergency Department Encounter     Evaluation Date & Time:   No admission date for patient encounter.    CHIEF COMPLAINT:  Flank Pain (2 mm stone.  [facility] sent patient in.)      Triage Note:  Patient with KNOWN kidney stone was seen last 2023.  [facility] did not get back to patient until today and said that they could prescribe more medications, but that pain was still so out of control that she should be seen again in the emergency department.    NOW also having stiff neck and inflamed lymph nodes in the back.    Having hot and cold flashes.  Did have influenza a few weeks ago and has residual cough.   Triage Assessment     Row Name 23 1403       Triage Assessment (Adult)    Airway WDL WDL       Respiratory WDL    Respiratory WDL WDL       Skin Circulation/Temperature WDL    Skin Circulation/Temperature WDL WDL       Cardiac WDL    Cardiac WDL WDL       Peripheral/Neurovascular WDL    Peripheral Neurovascular WDL WDL       Cognitive/Neuro/Behavioral WDL    Cognitive/Neuro/Behavioral WDL WDL                  FINAL IMPRESSION:    ICD-10-CM    1. Calculus of ureter  N20.1           Impression and Plan     ED COURSE & MEDICAL DECISION MAKIN:12 PM I met with the patient, obtained history, performed an initial exam, and discussed options and plan for diagnostics and treatment here in the ED.  3:46 PM I reevaluated the patient.    ED Course as of 23   1442 Pt did not drive here.  She has ongoing symptoms of her kidney stone.  A few days after the diagnosis (5d ptp), she had limited uop but that has now normalized again.  Denies fever and her medication is working for nausea.  Percocet helped her pain but since ran out yesterday has just been using ibuprofen/acetaminophen without improvement.     170 No evidence of uti.  Continue to address pain    She is upset with [facility] poor communication.  However, in hte past she has had quite good relationship with them and Dr. Ku  and would like to continue to follow up with them.  Will renew her oxycodone rx and then she has her acetaminophebn/ibuprofen/dramamine.       At the conclusion of the encounter I discussed the results of all the tests and the disposition. The questions were answered. The patient or family acknowledged understanding and was agreeable with the care plan.          0 minutes of critical care time        MEDICATIONS GIVEN IN THE EMERGENCY DEPARTMENT:  Medications   HYDROmorphone (DILAUDID) injection 1 mg (has no administration in time range)   ketorolac (TORADOL) injection 15 mg (15 mg Intravenous $Given 6/14/23 1446)   0.9% sodium chloride BOLUS (0 mLs Intravenous Stopped 6/14/23 1534)   oxyCODONE-acetaminophen (PERCOCET) 5-325 MG per tablet 2 tablet (2 tablets Oral $Given 6/14/23 1535)   HYDROmorphone (DILAUDID) injection 1 mg (1 mg Intravenous $Given 6/14/23 9790)       NEW PRESCRIPTIONS STARTED AT TODAY'S ED VISIT:  Current Discharge Medication List          HPI     History obtained from: Patient      Isha Valderrama is a 47 year old female with a pertinent history of kidney stones, asthma, anxiety who presents to this ED by walk in for evaluation of flank pain. Patient has a known left sided kidney stone as outlined in the chart review, below and states the [facility] did not call to follow up with her until today and has an appointment to be seen on 06/19. However, since she states she was not called back in a timely manner, she is running out of pain medications and reports to the ED primarily for pain control. She currently endorses pain to her left flank. She has been taking ibuprofen, Tylenol, dramamine for her pain without relief. Yesterday she took oxycodone which she states is the only medication which improves her pain. Overall, patient states her pain has been migrating down her left flank. She she reports some retention around the time she was last seen in the ED, but this has since  "resolved.    As a completely separate complaint, patient states she had the flu last week and states the lymph nodes in her neck feel \"swollen\".    Per chart review, the patient was seen at this ED on 06/09/23. Patient presented with left sided flank pain and was found to have a 2mm kidney stone to the proximal left ureter with hydronephrosis and hydroureter. Slightly elevated white count. Remainder of labs unremarkable. Dilaudid 1 mg x2, Toradol 15 mg, Tylenol, Zofran for pain control in the ED. Patient was discharged with dramamine, Zofran, oxycodone 5 mg x12 tablets, and follow-up with [facility].    REVIEW OF SYSTEMS:  Review of Systems   Genitourinary: Positive for flank pain (left).   Hematological: Positive for adenopathy (neck nodes feel \"swollen\").     remainder of systems are all otherwise negative.        Medical History     Past Medical History:   Diagnosis Date     Anxiety      Kidney stone      Uncomplicated asthma        Past Surgical History:   Procedure Laterality Date     KIDNEY STONE SURGERY      Kidney Stone [place] (Dr. Ku)     KNEE SURGERY       ORTHOPEDIC SURGERY       OTHER SURGICAL HISTORY      bilateral ureter stents     TONSILLECTOMY       TONSILLECTOMY         Family History   Problem Relation Age of Onset     Urolithiasis Father         recurrent     Urolithiasis Paternal Aunt         recurrent     Urolithiasis Paternal Grandmother         recurrent       Social History     Tobacco Use     Smoking status: Every Day     Packs/day: 1.00     Types: Cigarettes   Substance Use Topics     Alcohol use: No     Drug use: No       oxyCODONE (ROXICODONE) 5 MG tablet  Acetaminophen (TYLENOL PO)  acetaminophen (TYLENOL) 500 MG tablet  ALBUTEROL IN  dimenhyDRINATE (DRAMAMINE) 50 MG tablet  dimenhyDRINATE (DRAMAMINE) 50 MG tablet  Fluticasone Propionate, Inhal, (FLOVENT IN)  ibuprofen (ADVIL,MOTRIN) 200 MG tablet  ketorolac (TORADOL) 10 MG tablet  LORAZEPAM PO  MedroxyPROGESTERone Acetate (DEPO-PROVERA " "IM)  oxyCODONE-acetaminophen (PERCOCET) 5-325 MG per tablet  SERTRALINE HCL PO        Physical Exam     First Vitals:  Patient Vitals for the past 24 hrs:   BP Pulse SpO2 Height Weight   06/14/23 1630 -- 82 92 % -- --   06/14/23 1530 (!) 140/61 75 95 % -- --   06/14/23 1402 -- -- -- 1.6 m (5' 3\") 113.4 kg (250 lb)       PHYSICAL EXAM:   Constitutional:   Holding left flank   HENT:  Normocephalic, posterior pharynx wnl, mucous membranes moist and dark pink   Eyes:  PERRL, EOMI, Conjunctiva normal, No discharge, no scleral icterus.  Respiratory:  Breathing easily, lungs clear to auscultation  Cardiovascular:  Regular rate and rhythm, nl s1s2 0 murmurs, rubs, or gallops.  Peripheral pulses dp, pt, and radial are wnl.  No peripheral edema   GI:  Bowel sounds normal, Soft, No tenderness, No flank tenderness, nondistended.  :No CVA tenderness.   Musculoskeletal:  Moves all extremities.  No erythematous or swollen major joints,   Integument:  Normal skin color  Lymphatic:  No cervical lymphadenopathy  Neurologic:  Alert & oriented x 3, Normal motor function, Normal sensory function, No focal deficits noted. Normal speech.  Psychiatric:  Affect normal, Judgment normal, Mood normal.     Results     LAB AND RADIOLOGY:  All pertinent labs reviewed and interpreted  Results for orders placed or performed during the hospital encounter of 06/14/23   [place] Draw     Status: None    Narrative    The following orders were created for panel order [place] Draw.  Procedure                               Abnormality         Status                     ---------                               -----------         ------                     Extra Urine Collection[[id number]]                           Final result                 Please view results for these tests on the individual orders.   Extra Urine Collection     Status: None   Result Value Ref Range    Hold Specimen JIC    UA with Microscopic reflex to Culture     Status: Abnormal    " Specimen: Urine, Midstream   Result Value Ref Range    Color Urine Light Yellow Colorless, Straw, Light Yellow, Yellow    Appearance Urine Clear Clear    Glucose Urine Negative Negative mg/dL    Bilirubin Urine Negative Negative    Ketones Urine Negative Negative mg/dL    Specific Gravity Urine 1.015 1.001 - 1.030    Blood Urine 0.03 mg/dL (A) Negative    pH Urine 7.5 (H) 5.0 - 7.0    Protein Albumin Urine Negative Negative mg/dL    Urobilinogen Urine <2.0 <2.0 mg/dL    Nitrite Urine Negative Negative    Leukocyte Esterase Urine Negative Negative    Mucus Urine Present (A) None Seen /LPF    RBC Urine 6 (H) <=2 /HPF    WBC Urine <1 <=5 /HPF    Squamous Epithelials Urine 1 <=1 /HPF    Narrative    Urine Culture not indicated   Comprehensive metabolic panel     Status: Normal   Result Value Ref Range    Sodium 138 136 - 145 mmol/L    Potassium 4.3 3.5 - 5.0 mmol/L    Chloride 102 98 - 107 mmol/L    Carbon Dioxide (CO2) 30 22 - 31 mmol/L    Anion Gap 6 5 - 18 mmol/L    Urea Nitrogen 8 8 - 22 mg/dL    Creatinine 0.67 0.60 - 1.10 mg/dL    Calcium 9.3 8.5 - 10.5 mg/dL    Glucose 99 70 - 125 mg/dL    Alkaline Phosphatase 73 45 - 120 U/L    AST 19 0 - 40 U/L    ALT 15 0 - 45 U/L    Protein Total 6.9 6.0 - 8.0 g/dL    Albumin 3.7 3.5 - 5.0 g/dL    Bilirubin Total 0.5 0.0 - 1.0 mg/dL    GFR Estimate >90 >60 mL/min/1.73m2   [place] Draw     Status: None    Narrative    The following orders were created for panel order [place] Draw.  Procedure                               Abnormality         Status                     ---------                               -----------         ------                     Extra Blood Culture Bottle[[id number]]                       Final result               Extra Red Top Tube[[id number]]                               Final result               Extra Green Top (Lithium...[[id number]]                      Final result               Extra Purple Top Tube[[id number]]                            Final  result                 Please view results for these tests on the individual orders.   CBC with platelets and differential     Status: Abnormal   Result Value Ref Range    WBC Count 13.0 (H) 4.0 - 11.0 10e3/uL    RBC Count 4.79 3.80 - 5.20 10e6/uL    Hemoglobin 15.3 11.7 - 15.7 g/dL    Hematocrit 44.5 35.0 - 47.0 %    MCV 93 78 - 100 fL    MCH 31.9 26.5 - 33.0 pg    MCHC 34.4 31.5 - 36.5 g/dL    RDW 13.5 10.0 - 15.0 %    Platelet Count 445 150 - 450 10e3/uL    % Neutrophils 72 %    % Lymphocytes 19 %    % Monocytes 7 %    % Eosinophils 1 %    % Basophils 1 %    % Immature Granulocytes 0 %    NRBCs per 100 WBC 0 <1 /100    Absolute Neutrophils 9.3 (H) 1.6 - 8.3 10e3/uL    Absolute Lymphocytes 2.5 0.8 - 5.3 10e3/uL    Absolute Monocytes 0.9 0.0 - 1.3 10e3/uL    Absolute Eosinophils 0.2 0.0 - 0.7 10e3/uL    Absolute Basophils 0.1 0.0 - 0.2 10e3/uL    Absolute Immature Granulocytes 0.0 <=0.4 10e3/uL    Absolute NRBCs 0.0 10e3/uL   Extra Blood Culture Bottle     Status: None   Result Value Ref Range    Hold Specimen JIC    Extra Red Top Tube     Status: None   Result Value Ref Range    Hold Specimen JIC    Extra Green Top (Lithium Heparin) Tube     Status: None   Result Value Ref Range    Hold Specimen JIC    Extra Purple Top Tube     Status: None   Result Value Ref Range    Hold Specimen     CRP inflammation     Status: Abnormal   Result Value Ref Range    CRP 2.5 (H) 0.0 - <0.8 mg/dL   CBC with platelets differential     Status: Abnormal    Narrative    The following orders were created for panel order CBC with platelets differential.  Procedure                               Abnormality         Status                     ---------                               -----------         ------                     CBC with platelets and d...[[id number]]  Abnormal            Final result                 Please view results for these tests on the individual orders.           PROCEDURES:  Procedures:      [facility]  Documentation     Medical Decision Making    History:    Supplemental history from: Documented in chart, if applicable    External Record(s) reviewed: Documented in chart, if applicable.    Work Up:    Chart documentation includes differential considered and any EKGs or imaging independently interpreted by provider, where specified.    In additional to work up documented, I considered the following work up: Documented in chart, if applicable.    External consultation:    Discussion of management with another provider: Documented in chart, if applicable    Complicating factors:    Care impacted by chronic illness: N/A    Care affected by social determinants of health: Access to Medical Care    Disposition considerations: Discharge. I prescribed additional prescription strength medication(s) as charted. See documentation for any additional details.         The creation of this record is based on the scribe s observations of the work being performed by Ramses Jo and the provider s statements to them. This document has been checked and approved by MD Veronica Rodriguez MD  Emergency Medicine  [facility] EMERGENCY ROOM       Veronica Bartholomew MD  06/14/23 8446

## 2023-06-14 NOTE — Clinical Note
Isha Valderrama was seen and treated in our emergency department on 6/14/2023.  She may return to work on 06/20/2023.       If you have any questions or concerns, please don't hesitate to call.      Veronica Bartholomew MD

## 2023-06-14 NOTE — DISCHARGE INSTRUCTIONS
Return here for fever uncontrolled pain, or nausea or if unable to urinate.      Do not drive while taking oxycodone, or today.

## 2023-06-14 NOTE — TELEPHONE ENCOUNTER
Message left for patient to call back to the clinic that we can move up her appointment to 6/19, will fit her into the afternoon for a vv.  Await return call.  Aby Riley RN

## 2023-06-18 ENCOUNTER — NURSE TRIAGE (OUTPATIENT)
Dept: NURSING | Facility: CLINIC | Age: 48
End: 2023-06-18
Payer: COMMERCIAL

## 2023-06-18 NOTE — TELEPHONE ENCOUNTER
Patient was told she has an emergency appt on 06/19, but she cannot see it in her chart and is unsure what time.    She is also calling because she is going to run out of pain medicine by mid-morning tomorrow. She reports when she runs out, she ends up in the ED due to the severe pain.    Patient would like a call back at [phone].    Michael Cherry RN on 6/18/2023 at 5:33 PM    Reason for Disposition    Caller requesting a CONTROLLED substance prescription refill (e.g., narcotics, ADHD medicines)    Additional Information    Negative: [1] Prescription refill request for ESSENTIAL medicine (i.e., likelihood of harm to patient if not taken) AND [2] triager unable to refill per department policy    Negative: [1] Prescription not at pharmacy AND [2] was prescribed by PCP recently  (Exception: triager has access to EMR and prescription is recorded there. Go to Home Care and confirm for pharmacy.)    Negative: [1] Pharmacy calling with prescription questions AND [2] triager unable to answer question    Negative: Prescription request for new medicine (not a refill)    Protocols used: MEDICATION REFILL AND RENEWAL CALL-A-

## 2023-06-19 ENCOUNTER — VIRTUAL VISIT (OUTPATIENT)
Dept: UROLOGY | Facility: CLINIC | Age: 48
End: 2023-06-19
Attending: EMERGENCY MEDICINE
Payer: COMMERCIAL

## 2023-06-19 DIAGNOSIS — N20.0 CALCULUS OF KIDNEY: Primary | ICD-10-CM

## 2023-06-19 DIAGNOSIS — N20.1 URETEROLITHIASIS: ICD-10-CM

## 2023-06-19 PROCEDURE — 99244 OFF/OP CNSLTJ NEW/EST MOD 40: CPT | Mod: GT | Performed by: UROLOGY

## 2023-06-19 RX ORDER — TAMSULOSIN HYDROCHLORIDE 0.4 MG/1
0.4 CAPSULE ORAL DAILY
Qty: 30 CAPSULE | Refills: 1 | Status: SHIPPED | OUTPATIENT
Start: 2023-06-19 | End: 2024-07-29

## 2023-06-19 RX ORDER — OXYCODONE HYDROCHLORIDE 5 MG/1
5 TABLET ORAL EVERY 6 HOURS PRN
Qty: 20 TABLET | Refills: 0 | Status: SHIPPED | OUTPATIENT
Start: 2023-06-19 | End: 2023-06-29

## 2023-06-19 ASSESSMENT — PAIN SCALES - GENERAL: PAINLEVEL: EXTREME PAIN (8)

## 2023-06-19 NOTE — PROGRESS NOTES
Virtual Visit Details    Type of service:  Video Visit   Video Start Time: 2:33 PM  Video End Time:2:38 PM    Originating Location (pt. Location): Home    Distant Location (provider location):  On-site  Platform used for Video Visit: Erich      Name: Isha Valderrama   MRN: [id number]  YOB: 1975    Assessment and Plan:  47 year old female with with a 2 mm left ureteral stone currently on trial of passage with adequate pain control.     1.  Left ureteral stone    Discussed trial of passage for the left ureteral stone.  This involves controlling pain while we allow the ureteral stone to hopefully pass on its own.  Discussed off-label use of tamsulosin in order to facilitate stone passage and help improve pain associated with stone passage.  Prescription was sent for tamsulosin.  Also discussed use of ibuprofen 800 mg every 6 hours and acetaminophen 1000 mg every 6 hours for pain, not every 4 hours that she is currently utilizing.  Provided a limited dose of oxycodone 5 mg every 6 hours for breakthrough pain despite these measures as this is currently effective for her.    Patient will strain their urine in the interim.  If a stone is collected, will bring it in for analysis.    If in 4 weeks:  -catches stone and pain resolved, no further action is needed.  Will bring in stone for evaluation.  -has no pain but does not catch a stone, will get imaging (CT pelvis) to assess for stone location/passage.  -continues to have pain without stone passage, will proceed with stone treatment (ureteroscopy)    Rc Marquis MD  June 19, 2023         Chief Complaint: left ureteral stone    History of Present Illness:  Isha Valderrama is a 47 year old female seen in consultation from Dr. Jailene Neff for discussion of management of left ureteral stone.      The current episode was first found due to left renal colic with pain and nausea vomiting leading her to the emergency department on June 9, 2023.  She was  dismissed on trial of passage with pain medication however she ran out of medication and represented to the ER in June 14.    Her pain is relatively well controlled with acetaminophen 1000 mg every 4 hours, ibuprofen 800 mg every 6 hours, and oxycodone 5 mg every 4 hours as needed.  She was not given tamsulosin, and this is helped her past stones in the past.    No fevers, chills, nausea vomiting at this time.  She does feel like the stone is moving    CT angiogram chest abdomen pelvis performed on June 9, 2023 (images personally reviewed) demonstrated:  Right Kidney: No stones  Left Kidney: 2 mm mid ureteral stone with upstream hydronephrosis    Currently, they are experiencing minimal flank pain, no nausea, no vomiting, no hematuria, or no dysuria.  They have not experienced recent stone passage.     Pertinent stone history:  + Personal stone history  + Prior stone procedure (multiple prior surgeries with stent placement)  -- Prior stone analysis  -- Prior metabolic evaluation  -- Family stone history    Pertinent stone medical history  + Obesity (44.29)  -- Diabetes  -- Neurologic disease limiting mobility   -- Osteoporosis  -- Gout  -- Gastric bypass surgery  -- Sarcoidosis  -- Inflammatory bowel disease  -- History of non-stone  surgery     Pertinent medications:  -- Antiplatelet  -- Anticoagulant  -- Topiramate   -- Thiazide  -- Potassium supplement      I reviewed internal labs, of which pertinent ones include:   Hemoglobin   Date Value Ref Range Status   06/14/2023 15.3 11.7 - 15.7 g/dL Final   03/04/2018 15.4 11.7 - 15.7 g/dL Final     Potassium   Date Value Ref Range Status   06/14/2023 4.3 3.5 - 5.0 mmol/L Final   03/04/2018 3.6 3.4 - 5.3 mmol/L Final     Creatinine   Date Value Ref Range Status   06/14/2023 0.67 0.60 - 1.10 mg/dL Final   03/04/2018 0.64 0.52 - 1.04 mg/dL Final     pH Urine   Date Value Ref Range Status   06/14/2023 7.5 (H) 5.0 - 7.0 Final   03/04/2018 5.0 5.0 - 7.0 pH Final        Calcium   Date Value Ref Range Status   06/14/2023 9.3 8.5 - 10.5 mg/dL Final   03/04/2018 8.9 8.5 - 10.1 mg/dL Final         I reviewed internal records which in summarized above.         Past Medical History:  Past Medical History:   Diagnosis Date     Anxiety      Kidney stone      Uncomplicated asthma             Past Surgical History:  Past Surgical History:   Procedure Laterality Date     KIDNEY STONE SURGERY      Kidney Stone [place] (Dr. Ku)     KNEE SURGERY       ORTHOPEDIC SURGERY       OTHER SURGICAL HISTORY      bilateral ureter stents     TONSILLECTOMY       TONSILLECTOMY              Social History:  Social History     Tobacco Use     Smoking status: Every Day     Packs/day: 1.00     Types: Cigarettes   Substance Use Topics     Alcohol use: No     Drug use: No            Family History:  Family History   Problem Relation Age of Onset     Urolithiasis Father         recurrent     Urolithiasis Paternal Aunt         recurrent     Urolithiasis Paternal Grandmother         recurrent            Allergies:  Allergies   Allergen Reactions     Alprazolam Other (See Comments)     Like a zombie   Felt like a Zombi  Like a zombie   Felt like a Zombi       Compazine [Prochlorperazine] Other (See Comments)     Dystonic reaction     Morphine Itching     Hydroxyzine Anxiety     Other reaction(s): Other, see comments  Making her feel like her skin in crawling out               Medications:  Current Outpatient Medications   Medication Sig     Acetaminophen (TYLENOL PO) Take 1,000 mg by mouth every 6 hours as needed     ALBUTEROL IN      Fluticasone Propionate, Inhal, (FLOVENT IN)      ibuprofen (ADVIL,MOTRIN) 200 MG tablet Take 3 tablets (600 mg) by mouth every 8 hours as needed for mild pain (Patient taking differently: Take 800 mg by mouth every 6 hours as needed for mild pain)     LORAZEPAM PO      MedroxyPROGESTERone Acetate (DEPO-PROVERA IM)      oxyCODONE (ROXICODONE) 5 MG tablet Take 1 tablet (5 mg)  "by mouth every 4 hours as needed for severe pain If pain is not improved with acetaminophen and ibuprofen.     SERTRALINE HCL PO Take by mouth daily     No current facility-administered medications for this visit.       Review of Systems:   ROS: 10 point ROS neg other than the symptoms noted above in the HPI.    Physical Exam:  B/P: Data Unavailable, T: Data Unavailable, P: Data Unavailable, R: Data Unavailable  Estimated body mass index is 44.29 kg/m  as calculated from the following:    Height as of 6/14/23: 1.6 m (5' 3\").    Weight as of 6/14/23: 113.4 kg (250 lb).  General Appearance Adult: Alert, no acute distress, oriented  Lungs: no respiratory distress, or pursed lip breathing  Neuro: Alert, oriented, speech and mentation normal  Psych: affect and mood normal    Outside records:   I spent 0 minutes reviewing outside records.    "

## 2023-06-19 NOTE — PROGRESS NOTES
Patient is roomed via telephone for a telehealth visit.  Patient confirmed she is in the [facility] at the time of this appointment.  Patient understand that this visit is billable and agree to proceed with appointment.

## 2023-06-22 ENCOUNTER — TELEPHONE (OUTPATIENT)
Dept: UROLOGY | Facility: CLINIC | Age: 48
End: 2023-06-22
Payer: COMMERCIAL

## 2023-06-22 NOTE — TELEPHONE ENCOUNTER
Spoke with patient who states that she is doing okay, still has not passed her stone and is straining her urine.  She will call when it passes.  Aby Riley RN

## 2023-06-26 NOTE — TELEPHONE ENCOUNTER
Patient is calling asking for a call back from her care team because she still hasn't passed the stone. Thank you.

## 2023-06-27 NOTE — TELEPHONE ENCOUNTER
Spoke with patient who states that she has not passed her stone.  She has been having intermittent cramping and occasional nausea.  Will update provider.  Aby Riley RN

## 2023-06-29 ENCOUNTER — HOSPITAL ENCOUNTER (OUTPATIENT)
Facility: AMBULATORY SURGERY CENTER | Age: 48
End: 2023-06-29
Attending: UROLOGY
Payer: MEDICAID

## 2023-06-29 ENCOUNTER — PATIENT OUTREACH (OUTPATIENT)
Dept: UROLOGY | Facility: CLINIC | Age: 48
End: 2023-06-29
Payer: COMMERCIAL

## 2023-06-29 ENCOUNTER — TELEPHONE (OUTPATIENT)
Dept: UROLOGY | Facility: CLINIC | Age: 48
End: 2023-06-29
Payer: COMMERCIAL

## 2023-06-29 DIAGNOSIS — N20.0 CALCULUS OF KIDNEY: Primary | ICD-10-CM

## 2023-06-29 DIAGNOSIS — N20.1 URETEROLITHIASIS: ICD-10-CM

## 2023-06-29 RX ORDER — OXYCODONE HYDROCHLORIDE 5 MG/1
5 TABLET ORAL EVERY 6 HOURS PRN
Qty: 20 TABLET | Refills: 0 | Status: SHIPPED | OUTPATIENT
Start: 2023-06-29 | End: 2023-10-02

## 2023-06-29 NOTE — PROGRESS NOTES
Procedure: Cystoscopy, Left Ureteroscopy, Left Retrograde Pyelogram, Left Laser Lithotripsy, Possible Left Ureteral Stent Placement    Date: 07/10/2023  Provider: Kandis      Post op appt: luisa    H&P: yes and scheduled for 07/06/2023  UA/UC: yes scheduled for tomorrow at [facility] and pt sent mychart with details. UC order also faxed and ordered in [facility]- has access as well    Medications: yes  Soap: yes  Reviewed when to start clear liquids and when to start NPO: yes  : yes  24 hour observation: yes    Pt or family member expressed understanding: yes    Tiffanie Malloy, RN  6/29/2023  4:51 PM

## 2023-06-29 NOTE — TELEPHONE ENCOUNTER
Spoke with: Patient       Date of surgery: Monday July 10 2023       Location: [facility]      Informed patient they will need a adult : YES      Pre op with provider: Patient will set up with her PCP [facility]       H&P Scheduled in PAC- NA        Pre procedure covid : Not required      Additional imaging: NA        Surgery Packet :Sent via "[employer]"      Additional comments:Please call for surgery Teaching

## 2023-06-29 NOTE — TELEPHONE ENCOUNTER
Patient continues to have pain with her ureteral stone and is requesting a refill of pain medication, last fill 6/19.  Aby Riley RN

## 2023-06-30 ENCOUNTER — TELEPHONE (OUTPATIENT)
Dept: UROLOGY | Facility: CLINIC | Age: 48
End: 2023-06-30
Payer: COMMERCIAL

## 2023-06-30 RX ORDER — CEFAZOLIN SODIUM 2 G/100ML
2 INJECTION, SOLUTION INTRAVENOUS
Status: CANCELLED | OUTPATIENT
Start: 2023-06-30

## 2023-06-30 RX ORDER — CEFAZOLIN SODIUM 2 G/100ML
2 INJECTION, SOLUTION INTRAVENOUS SEE ADMIN INSTRUCTIONS
Status: CANCELLED | OUTPATIENT
Start: 2023-06-30

## 2023-06-30 NOTE — TELEPHONE ENCOUNTER
----- Message from Negar Howard LPN sent at 6/30/2023  8:06 AM CDT -----  Regarding: RE: Order for   Meg Moreno, I can fax again, did you have the fax number that you sent it to?    -Negar  ----- Message -----  From: Tiffanie Malloy RN  Sent: 6/29/2023   5:18 PM CDT  To: Staci Remy; Negar Howard LPN  Subject: Order for [facility] Staci~    I added Negar in case you are out or in later.        Can you please fax to [facility] the  order from Dr Marquis?  She has an appointment there at 1040 tomorrow morning.    I did send thru Epic but they are unsure if they will see it or get it????    Thank you SO MUCH!      [facility]  [phone]

## 2023-06-30 NOTE — CONFIDENTIAL NOTE
RNCC sent fax again via [employer] to [phone].  Previous fax number noted was incorrect      [facility]  [phone]

## 2023-07-02 ENCOUNTER — HEALTH MAINTENANCE LETTER (OUTPATIENT)
Age: 48
End: 2023-07-02

## 2023-07-03 NOTE — CONFIDENTIAL NOTE
UMA called [facility] to request the  results from 06/30 be faxed to me.  Gave fax number    Will ask clinic staff to watch for fax and send to my email      [facility]  [phone]

## 2023-07-05 ENCOUNTER — TELEPHONE (OUTPATIENT)
Dept: UROLOGY | Facility: CLINIC | Age: 48
End: 2023-07-05
Payer: COMMERCIAL

## 2023-07-05 ENCOUNTER — PATIENT OUTREACH (OUTPATIENT)
Dept: UROLOGY | Facility: CLINIC | Age: 48
End: 2023-07-05
Payer: COMMERCIAL

## 2023-07-05 DIAGNOSIS — N20.0 CALCULUS OF KIDNEY: Primary | ICD-10-CM

## 2023-07-05 RX ORDER — POLYETHYLENE GLYCOL 3350 17 G/17G
1 POWDER, FOR SOLUTION ORAL DAILY
Qty: 238 G | Refills: 0 | Status: SHIPPED | OUTPATIENT
Start: 2023-07-05 | End: 2024-08-01

## 2023-07-05 NOTE — TELEPHONE ENCOUNTER
Spoke with patient who will see how she feels in the next couple of days and will see if surgery should be cancelled.  Advised miralax for constipation, sent prescription to pharmacy.  Will send work note also in am.  Aby Riley RN

## 2023-07-05 NOTE — PROGRESS NOTES
UMA called the lab at [facility] and requested they send the UC again as we did not receive it.    Fax requested x2       [facility]  [phone]

## 2023-07-05 NOTE — TELEPHONE ENCOUNTER
[facility] Call Center    Phone Message    May a detailed message be left on voicemail: no     Reason for Call: Other: Patient callled and stated that she thinks she has passed the kidney stone and would like to cancel the procedure. Patient stated that she would like the paper work sent so that she can go back to work. And that she would like something to help her have a bowl movement due to not having one in four days. Please call patient back to follow up.     Action Taken: [facility]    Travel Screening: Not Applicable

## 2023-07-06 ENCOUNTER — PATIENT OUTREACH (OUTPATIENT)
Dept: UROLOGY | Facility: CLINIC | Age: 48
End: 2023-07-06
Payer: COMMERCIAL

## 2023-07-06 NOTE — PROGRESS NOTES
RNCC called [facility] to obtain verbal result for the UC that the patient was to give on 06/30/2023.  Per lab they did not get a sample or enough sample to run, and have no results and that is whey we have not gotten any faxed results.  RNCC called the lab yesterday and was told that they had a result but could not give verbal and would fax.    Writer left message for pt with request for call back and to keep her pre-op and UC that the clinic confirmed she has today.    Will update provide to decide if will need to reschedule or not.  Pt also told prior RNCC noted that she was possibly canceling the surgery as she thinks she passed the kidney stone.      [facility]  [phone]

## 2023-07-07 ENCOUNTER — TELEPHONE (OUTPATIENT)
Dept: UROLOGY | Facility: CLINIC | Age: 48
End: 2023-07-07
Payer: COMMERCIAL

## 2023-07-07 NOTE — TELEPHONE ENCOUNTER
Per patient - Surgery at [facility] cx July 10 - Patient not having pain and thinks she may have passed stone. See nurse note- ANGELIQUE

## 2023-07-07 NOTE — TELEPHONE ENCOUNTER
Message left for patient to call back to confirm that she would like to cancel her surgery.  Aby Riley RN    Spoke with patient who states that she is feeling well and would like to cancel her surgery for Monday.  She understands to call if she has any symptoms or issues.  Aby Riley RN

## 2023-09-30 ENCOUNTER — HOSPITAL ENCOUNTER (EMERGENCY)
Facility: CLINIC | Age: 48
Discharge: HOME | End: 2023-09-30
Payer: COMMERCIAL

## 2023-09-30 VITALS
WEIGHT: 232 LBS | BODY MASS INDEX: 41.11 KG/M2 | DIASTOLIC BLOOD PRESSURE: 65 MMHG | HEIGHT: 63 IN | TEMPERATURE: 98.5 F | OXYGEN SATURATION: 96 % | HEART RATE: 65 BPM | SYSTOLIC BLOOD PRESSURE: 132 MMHG | RESPIRATION RATE: 20 BRPM

## 2023-09-30 DIAGNOSIS — K08.89 PAIN, DENTAL: ICD-10-CM

## 2023-09-30 DIAGNOSIS — K04.7 DENTAL INFECTION: ICD-10-CM

## 2023-09-30 PROCEDURE — 64400 NJX AA&/STRD TRIGEMINAL NRV: CPT

## 2023-09-30 PROCEDURE — 250N000009 HC RX 250

## 2023-09-30 PROCEDURE — 99284 EMERGENCY DEPT VISIT MOD MDM: CPT | Mod: 25

## 2023-09-30 RX ORDER — BUPIVACAINE HYDROCHLORIDE AND EPINEPHRINE 5; 5 MG/ML; UG/ML
1.8 INJECTION, SOLUTION PERINEURAL ONCE
Status: COMPLETED | OUTPATIENT
Start: 2023-09-30 | End: 2023-09-30

## 2023-09-30 RX ORDER — OXYCODONE HYDROCHLORIDE 5 MG/1
5 TABLET ORAL EVERY 6 HOURS PRN
Qty: 8 TABLET | Refills: 0 | Status: SHIPPED | OUTPATIENT
Start: 2023-09-30 | End: 2023-10-02

## 2023-09-30 RX ADMIN — BUPIVACAINE HYDROCHLORIDE AND EPINEPHRINE BITARTRATE 1.8 ML: 5; .005 INJECTION, SOLUTION SUBCUTANEOUS at 16:47

## 2023-09-30 ASSESSMENT — ENCOUNTER SYMPTOMS
CHILLS: 0
FEVER: 0
TROUBLE SWALLOWING: 0

## 2023-09-30 NOTE — ED PROVIDER NOTES
EMERGENCY DEPARTMENT ENCOUNTER      NAME: Isha Valderrama  AGE: 48 year old female  YOB: 1975  MRN: [id number]  EVALUATION DATE & TIME: 9/30/2023  4:08 PM    PCP: Dewayne Barrera    ED PROVIDER: Leti Eli PA-C    Chief Complaint   Patient presents with    Dental Pain    Otalgia     FINAL IMPRESSION:  1. Pain, dental    2. Dental infection      MEDICAL DECISION MAKING:    Pertinent Labs & Imaging studies reviewed. (See chart for details)  Isha Valderrama is a 48 year old female who presents for evaluation of right-sided lower tooth pain.  Patient reports a history of a cracked and missing tooth for the past few weeks, last evening, developed an acute onset of worsening right lower tooth pain that radiates to her right ear.  Having hard time sleeping due to the pain.  Reports she is afraid to go to the dentist, however does see a dentist that she routinely follows with.  Denies fever, chills, difficulty speaking or swallowing, vomiting or any other concerning symptoms.    On my initial evaluation, vitals normal.  On physical exam, patient is uncomfortable appearing, clutching the right side of her jaw.  Does not appear ill or toxic.  On inspection of her mouth, she has several missing teeth with poor dentition.  Multiple dental caries.  She has erythema and swelling to the gumline over teeth number 28,29,30, she has a missing tooth number 29, 20.  No lip or tongue swelling or difficulty tolerating secretions.  No change to voice or pain or swelling beneath tongue.  Mild tenderness to external right submandibular cheek area.  No erythema.  Bilateral TMs are clear without erythema or effusion.  No foreign object.    Differential diagnosis includes gingivitis, dental caries, periapical abscess, nerve exposure, cracked tooth, gumline abscess, deep space infection, Francois's angina, otitis media, otitis externa, ACS.      Suspect this is periapical abscess versus dental infection.  No obvious  fluctuance or abscess that would require I&D here today in the ER.  Ultimately, patient will need to follow-up with a dentist for ongoing management of tooth pain.  In the meantime, we will send home with Augmentin and oxycodone.  She does have a dentist and I advised her to call to make an appointment for follow-up and ongoing management of tooth pain.  I did give her a dental block here today with significant relief of her symptoms.  She is driving herself today, so was not able to give her something stronger for pain while she was here, however sent to her pharmacy.  Patient is otherwise clinically well-appearing and vitally stable, low suspicion for any emergent process that require further intervention or hospital admission.  No significant cheek swelling, difficulty tone secretions, change to voice or swelling beneath tongue, low suspicion for deep space infection, Bala's angina.  No surrounding facial erythema, edema or fluctuance to suggest facial cellulitis or abscess.  Bilateral TMs are clear, suspicion for otitis media.  No chest pain or shortness of breath, low suspicion that her jaw pain is related to ACS and is instead probably due to a dental infection.  Provided expectant management as well as strict return precautions.    Patient has had serial examinations and notes significant improvement.     Patient was discharged in stable condition with treatment plan as below. Instructed to follow up with primary care provider in 3 days and with dentist and return to the emergency department with any new or worsening of symptoms. Patient expressed understanding, feels comfortable, and is in agreement with this plan. All questions addressed prior to discharge.    Medical Decision Making    History:  Supplemental history from: Documented in chart, if applicable  External Record(s) reviewed: Documented in chart, if applicable.    Work Up:  Chart documentation includes differential considered and any EKGs or  imaging independently interpreted by provider, where specified.  In additional to work up documented, I considered the following work up: Documented in chart, if applicable.    External consultation:  Discussion of management with another provider: Documented in chart, if applicable    Complicating factors:  Care impacted by chronic illness: N/A  Care affected by social determinants of health: N/A    Disposition considerations: Discharge. I prescribed additional prescription strength medication(s) as charted. See documentation for any additional details.    ED COURSE:  4:16 PM I reviewed the patient's chart. I met with the patient to gather history and to perform my initial exam.   4:40 PM performed dental block, patient reports immediate relief. We discussed plan for discharge including treatment plan, follow-up and return precautions to emergency department.  Patient voiced understanding and in agreement with this plan.    At the conclusion of the encounter I discussed the results of all of the tests and the disposition. The questions were answered. The patient or family acknowledged understanding and was agreeable with the care plan.     Voice recognition software was used in the creation of this note. Any grammatical or nonsensical errors are due to inherent errors with the software and are not the intention of the writer.     MEDICATIONS GIVEN IN THE EMERGENCY:  Medications   BUPivacaine 0.5 % EPINEPHrine 1:200,000 0.5% -1:[id number] dental injection SOLN 1.8 mL (has no administration in time range)       NEW PRESCRIPTIONS STARTED AT TODAY'S ER VISIT  New Prescriptions    AMOXICILLIN-CLAVULANATE (AUGMENTIN) 875-125 MG TABLET    Take 1 tablet by mouth 2 times daily for 7 days    OXYCODONE (ROXICODONE) 5 MG TABLET    Take 1 tablet (5 mg) by mouth every 6 hours as needed for pain     =================================================================    HPI:    Patient information was obtained from: patient    Use of  Interpretor: N/A    Isha Valderrama is a 48 year old female with a pertinent history of anxiety, asthma who presents to this ED by private car for evaluation of dental pain. Patient reports that she has had a broken tooth for awhile, but has had constant 10/10 right sided dental pain since last night. Pain radiates into her right ear, and she is concerned about an abscess. She has taken leftover percocet x3 and 800mg of ibuprofen without relief. Last had ibuprofen 1 hour ago. She denies any difficulty talking or swallowing. No fevers or chills. She does have a dentist, but states that she doesn't like going and hasn't tried calling to get in. She denies any other concerns.    REVIEW OF SYSTEMS:  Review of Systems   Constitutional:  Negative for chills and fever.   HENT:  Positive for dental problem (R sided dental pain) and ear pain (R). Negative for trouble swallowing.    All other systems reviewed and are negative.      PAST MEDICAL HISTORY:  Past Medical History:   Diagnosis Date    Anxiety     Kidney stone     Uncomplicated asthma        PAST SURGICAL HISTORY:  Past Surgical History:   Procedure Laterality Date    KIDNEY STONE SURGERY      Kidney Stone [place] (Dr. Ku)    KNEE SURGERY      ORTHOPEDIC SURGERY      OTHER SURGICAL HISTORY      bilateral ureter stents    TONSILLECTOMY      TONSILLECTOMY         CURRENT MEDICATIONS:      Current Facility-Administered Medications:     BUPivacaine 0.5 % EPINEPHrine 1:200,000 0.5% -1:699073 dental injection SOLN 1.8 mL, 1.8 mL, Intradermal, Once, Leti Eli PA-C    Current Outpatient Medications:     amoxicillin-clavulanate (AUGMENTIN) 875-125 MG tablet, Take 1 tablet by mouth 2 times daily for 7 days, Disp: 14 tablet, Rfl: 0    oxyCODONE (ROXICODONE) 5 MG tablet, Take 1 tablet (5 mg) by mouth every 6 hours as needed for pain, Disp: 8 tablet, Rfl: 0    Acetaminophen (TYLENOL PO), Take 1,000 mg by mouth every 6 hours as needed, Disp: , Rfl:     ALBUTEROL  IN, , Disp: , Rfl:     Fluticasone Propionate, Inhal, (FLOVENT IN), , Disp: , Rfl:     ibuprofen (ADVIL,MOTRIN) 200 MG tablet, Take 3 tablets (600 mg) by mouth every 8 hours as needed for mild pain (Patient taking differently: Take 800 mg by mouth every 6 hours as needed for mild pain), Disp: 60 tablet, Rfl: 0    LORAZEPAM PO, , Disp: , Rfl:     MedroxyPROGESTERone Acetate (DEPO-PROVERA IM), , Disp: , Rfl:     oxyCODONE (ROXICODONE) 5 MG tablet, Take 1 tablet (5 mg) by mouth every 6 hours as needed for pain, Disp: 20 tablet, Rfl: 0    oxyCODONE (ROXICODONE) 5 MG tablet, Take 1 tablet (5 mg) by mouth every 4 hours as needed for severe pain If pain is not improved with acetaminophen and ibuprofen., Disp: 12 tablet, Rfl: 0    polyethylene glycol (MIRALAX) 17 GM/Dose powder, Take 17 g (1 Capful) by mouth daily, Disp: 238 g, Rfl: 0    SERTRALINE HCL PO, Take by mouth daily, Disp: , Rfl:     tamsulosin (FLOMAX) 0.4 MG capsule, Take 1 capsule (0.4 mg) by mouth daily, Disp: 30 capsule, Rfl: 1    ALLERGIES:  Allergies   Allergen Reactions    Alprazolam Other (See Comments)     Like a zombie   Felt like a Zombi  Like a zombie   Felt like a Zombi      Compazine [Prochlorperazine] Other (See Comments)     Dystonic reaction    Morphine Itching    Hydroxyzine Anxiety     Other reaction(s): Other, see comments  Making her feel like her skin in crawling out          FAMILY HISTORY:  Family History   Problem Relation Age of Onset    Urolithiasis Father         recurrent    Urolithiasis Paternal Aunt         recurrent    Urolithiasis Paternal Grandmother         recurrent       SOCIAL HISTORY:   Social History     Socioeconomic History    Marital status: Single   Tobacco Use    Smoking status: Every Day     Packs/day: 1.00     Types: Cigarettes   Substance and Sexual Activity    Alcohol use: No    Drug use: No   Social History Narrative    Lives with family.       VITALS:  Patient Vitals for the past 24 hrs:   BP Temp Pulse Resp SpO2  "Height Weight   09/30/23 1552 135/72 98.5  F (36.9  C) 77 16 98 % 1.6 m (5' 3\") 105.2 kg (232 lb)       PHYSICAL EXAM    Constitutional: Well developed, Well nourished, NADuncomfortable appearing, clutching the right side of her jaw, not ill or toxic  HENT: Normocephalic, Atraumatic, Bilateral external ears normal, On inspection of her mouth, she has several missing teeth with poor dentition.  Multiple dental caries.  She has erythema and swelling to the gumline over teeth number 28,29,30, she has a missing tooth number 29, 20.  No lip or tongue swelling or difficulty tolerating secretions.  No change to voice or pain or swelling beneath tongue.  Mild tenderness to external right submandibular cheek area.  No erythema.  Bilateral TMs are clear without erythema or effusion.  No foreign object.mucous membranes moist, Nose normal.   Neck: Normal range of motion, No tenderness, Supple, No stridor.  Eyes: PERRL, EOMI, Conjunctiva normal, No discharge.   Musculoskeletal: 2+ DP pulses. No edema. No cyanosis, No clubbing. Good range of motion in all major joints. No tenderness to palpation or major deformities noted. No tenderness of the CTLS spine.   Integument: Warm, Dry, No erythema, No rash. No petechiae.  Neurologic: Alert & oriented x 3, Normal motor function, Normal sensory function, No focal deficits noted. Normal gait.  Psychiatric: Affect normal, Judgment normal, Mood normal. Cooperative.    LAB:  All pertinent labs reviewed and interpreted.  Labs Ordered and Resulted from Time of ED Arrival to Time of ED Departure - No data to display    RADIOLOGY:  Reviewed all pertinent imaging. Please see official radiology report.  No orders to display       EKG:    None.    PROCEDURES:   PROCEDURE: Dental Nerve Block   INDICATIONS: Dental pain   PROCEDURE PROVIDER: Leti Eli PA-C   SITE: Right Inferior Alveolar (mandibular) Nerve to achieve anesthesia of Tooth #29     MEDICATION: 4 mL of 0.25% Bupivacaine with " epinephrine   NOTE: The usual mandibular landmarks were identified and the needle was positioned at the midpoint of the mandibular borders.  Area was aspirated and there was no return of blood.  I then injected the medication into the site.    COMPLICATIONS: Patient tolerated procedure well, without complication     Diagnosis:  1. Pain, dental    2. Dental infection      I, Jose Enrique Cameron, am serving as a scribe to document services personally performed by Leti Eli PA-C based on my observation and the provider's statements to me. I, Leti Eli PA-C attest that Jose Enrique Cameron is acting in a scribe capacity, has observed my performance of the services and has documented them in accordance with my direction.    Leti Eli PA-C  Emergency Medicine  [facility]  9/30/2023       Leti Eli PA-C  09/30/23 2787

## 2023-09-30 NOTE — ED TRIAGE NOTES
States rt side dental pain from broken tooth, now has abscess and ear pain on the right. 10/10

## 2023-09-30 NOTE — DISCHARGE INSTRUCTIONS
You were seen in the ER for tooth pain.  We suspect your pain is due to a dental infection.  Ultimately this will need to be seen by a dentist for further management.  In the meantime, we will start you on an antibiotic.  Please take Augmentin twice a day for 7 days.  Take Tylenol and ibuprofen for the pain, you will have oxycodone for breakthrough pain.  Please do not drive while taking this medication as it causes drowsiness.  Please call your dentist to make an appointment for follow-up, we also did place a referral for one of our dentist to call you to make an appointment.  Follow-up with the dentist for ongoing management of your tooth pain and return to the ER if you have any worsening pain or any new fevers, chills, difficulty swallowing or speaking, vomiting or any other concerning symptoms.

## 2023-10-02 ENCOUNTER — HOSPITAL ENCOUNTER (EMERGENCY)
Facility: CLINIC | Age: 48
Discharge: HOME | End: 2023-10-02
Attending: EMERGENCY MEDICINE | Admitting: EMERGENCY MEDICINE
Payer: COMMERCIAL

## 2023-10-02 ENCOUNTER — APPOINTMENT (OUTPATIENT)
Dept: CT IMAGING | Facility: CLINIC | Age: 48
End: 2023-10-02
Attending: EMERGENCY MEDICINE
Payer: COMMERCIAL

## 2023-10-02 VITALS
BODY MASS INDEX: 41.11 KG/M2 | RESPIRATION RATE: 16 BRPM | HEIGHT: 63 IN | DIASTOLIC BLOOD PRESSURE: 62 MMHG | WEIGHT: 232 LBS | TEMPERATURE: 98.2 F | HEART RATE: 77 BPM | SYSTOLIC BLOOD PRESSURE: 128 MMHG | OXYGEN SATURATION: 96 %

## 2023-10-02 DIAGNOSIS — K04.7 DENTAL INFECTION: ICD-10-CM

## 2023-10-02 LAB
ANION GAP SERPL CALCULATED.3IONS-SCNC: 8 MMOL/L (ref 7–15)
BASOPHILS # BLD AUTO: 0.1 10E3/UL (ref 0–0.2)
BASOPHILS NFR BLD AUTO: 1 %
BUN SERPL-MCNC: 10.6 MG/DL (ref 6–20)
CALCIUM SERPL-MCNC: 8.6 MG/DL (ref 8.6–10)
CHLORIDE SERPL-SCNC: 101 MMOL/L (ref 98–107)
CREAT SERPL-MCNC: 0.59 MG/DL (ref 0.51–0.95)
CRP SERPL-MCNC: 24.8 MG/L
DEPRECATED HCO3 PLAS-SCNC: 29 MMOL/L (ref 22–29)
EGFRCR SERPLBLD CKD-EPI 2021: >90 ML/MIN/1.73M2
EOSINOPHIL # BLD AUTO: 1.3 10E3/UL (ref 0–0.7)
EOSINOPHIL NFR BLD AUTO: 8 %
ERYTHROCYTE [DISTWIDTH] IN BLOOD BY AUTOMATED COUNT: 14.2 % (ref 10–15)
GLUCOSE SERPL-MCNC: 103 MG/DL (ref 70–99)
HCT VFR BLD AUTO: 40.6 % (ref 35–47)
HGB BLD-MCNC: 13.6 G/DL (ref 11.7–15.7)
IMM GRANULOCYTES # BLD: 0.1 10E3/UL
IMM GRANULOCYTES NFR BLD: 0 %
LYMPHOCYTES # BLD AUTO: 3.7 10E3/UL (ref 0.8–5.3)
LYMPHOCYTES NFR BLD AUTO: 24 %
MCH RBC QN AUTO: 31.9 PG (ref 26.5–33)
MCHC RBC AUTO-ENTMCNC: 33.5 G/DL (ref 31.5–36.5)
MCV RBC AUTO: 95 FL (ref 78–100)
MONOCYTES # BLD AUTO: 1.4 10E3/UL (ref 0–1.3)
MONOCYTES NFR BLD AUTO: 9 %
NEUTROPHILS # BLD AUTO: 9.1 10E3/UL (ref 1.6–8.3)
NEUTROPHILS NFR BLD AUTO: 58 %
NRBC # BLD AUTO: 0 10E3/UL
NRBC BLD AUTO-RTO: 0 /100
PLATELET # BLD AUTO: 414 10E3/UL (ref 150–450)
POTASSIUM SERPL-SCNC: 4.2 MMOL/L (ref 3.4–5.3)
RBC # BLD AUTO: 4.26 10E6/UL (ref 3.8–5.2)
SODIUM SERPL-SCNC: 138 MMOL/L (ref 135–145)
WBC # BLD AUTO: 15.6 10E3/UL (ref 4–11)

## 2023-10-02 PROCEDURE — 250N000011 HC RX IP 250 OP 636: Mod: JZ | Performed by: EMERGENCY MEDICINE

## 2023-10-02 PROCEDURE — 70491 CT SOFT TISSUE NECK W/DYE: CPT

## 2023-10-02 PROCEDURE — 96375 TX/PRO/DX INJ NEW DRUG ADDON: CPT

## 2023-10-02 PROCEDURE — 250N000011 HC RX IP 250 OP 636: Performed by: EMERGENCY MEDICINE

## 2023-10-02 PROCEDURE — 86140 C-REACTIVE PROTEIN: CPT | Performed by: EMERGENCY MEDICINE

## 2023-10-02 PROCEDURE — 99285 EMERGENCY DEPT VISIT HI MDM: CPT | Mod: 25

## 2023-10-02 PROCEDURE — 85025 COMPLETE CBC W/AUTO DIFF WBC: CPT | Performed by: EMERGENCY MEDICINE

## 2023-10-02 PROCEDURE — 96365 THER/PROPH/DIAG IV INF INIT: CPT | Mod: 59

## 2023-10-02 PROCEDURE — 36415 COLL VENOUS BLD VENIPUNCTURE: CPT | Performed by: EMERGENCY MEDICINE

## 2023-10-02 PROCEDURE — 96376 TX/PRO/DX INJ SAME DRUG ADON: CPT | Mod: 59

## 2023-10-02 PROCEDURE — 80048 BASIC METABOLIC PNL TOTAL CA: CPT | Performed by: EMERGENCY MEDICINE

## 2023-10-02 RX ORDER — KETOROLAC TROMETHAMINE 15 MG/ML
15 INJECTION, SOLUTION INTRAMUSCULAR; INTRAVENOUS ONCE
Status: COMPLETED | OUTPATIENT
Start: 2023-10-02 | End: 2023-10-02

## 2023-10-02 RX ORDER — CLINDAMYCIN PHOSPHATE 600 MG/50ML
600 INJECTION, SOLUTION INTRAVENOUS ONCE
Status: COMPLETED | OUTPATIENT
Start: 2023-10-02 | End: 2023-10-02

## 2023-10-02 RX ORDER — HYDROMORPHONE HYDROCHLORIDE 1 MG/ML
0.5 INJECTION, SOLUTION INTRAMUSCULAR; INTRAVENOUS; SUBCUTANEOUS ONCE
Status: COMPLETED | OUTPATIENT
Start: 2023-10-02 | End: 2023-10-02

## 2023-10-02 RX ORDER — OXYCODONE HYDROCHLORIDE 5 MG/1
5 TABLET ORAL EVERY 6 HOURS PRN
Qty: 6 TABLET | Refills: 0 | Status: SHIPPED | OUTPATIENT
Start: 2023-10-02 | End: 2023-10-05

## 2023-10-02 RX ORDER — IOPAMIDOL 755 MG/ML
90 INJECTION, SOLUTION INTRAVASCULAR ONCE
Status: COMPLETED | OUTPATIENT
Start: 2023-10-02 | End: 2023-10-02

## 2023-10-02 RX ADMIN — HYDROMORPHONE HYDROCHLORIDE 0.5 MG: 1 INJECTION, SOLUTION INTRAMUSCULAR; INTRAVENOUS; SUBCUTANEOUS at 07:00

## 2023-10-02 RX ADMIN — HYDROMORPHONE HYDROCHLORIDE 0.5 MG: 1 INJECTION, SOLUTION INTRAMUSCULAR; INTRAVENOUS; SUBCUTANEOUS at 08:23

## 2023-10-02 RX ADMIN — KETOROLAC TROMETHAMINE 15 MG: 15 INJECTION INTRAMUSCULAR; INTRAVENOUS at 06:22

## 2023-10-02 RX ADMIN — CLINDAMYCIN PHOSPHATE 600 MG: 600 INJECTION, SOLUTION INTRAVENOUS at 06:31

## 2023-10-02 RX ADMIN — IOPAMIDOL 90 ML: 755 INJECTION, SOLUTION INTRAVENOUS at 07:22

## 2023-10-02 ASSESSMENT — ACTIVITIES OF DAILY LIVING (ADL)
ADLS_ACUITY_SCORE: 35
ADLS_ACUITY_SCORE: 35

## 2023-10-02 NOTE — ED PROVIDER NOTES
EMERGENCY DEPARTMENT ENCOUNTER      NAME: Isha Valderrama  AGE: 48 year old female  YOB: 1975  MRN: [id number]  EVALUATION DATE & TIME: 10/2/2023  5:52 AM    PCP: Dewayne Barrera    ED PROVIDER: Zuleyka Ribera MD      Chief Complaint   Patient presents with    Dental Pain         FINAL IMPRESSION:  1. Dental infection          ED COURSE & MEDICAL DECISION MAKING:    Pertinent Labs & Imaging studies reviewed. (See chart for details)  48 year old female presents to the Emergency Department for evaluation of right sided facial swelling and tooth pain. Patient reports that her pain initially started 4 days ago, she was seen in the emergency department 3 days ago.  She was initiated on Augmentin and given Percocet for the pain.  She has been taking 1-2 Percocet without relief of her symptoms and she has developed swelling in the right side of her jaw despite taking the Augmentin.  She was given a referral to a dentist but has been unable to make an appointment as it was the weekend.  She continues to have severe pain.  On evaluation, the patient has facial edema over the right lower mandible.  The pain is associated with tooth 30.  She has had previous infection with extraction of tooth 31.  Patient is not otherwise immunocompromise.  She is not a diabetic.  We will plan for CT soft tissue neck to evaluate for underlying abscess, initiate IV antibiotics and treat the patient's pain with IV pain medication.    6:05 AM Met with patient for initial interview and exam. Discussed initial plan for care for their stay in the emergency department.  8:37 AM I spoke with Dr. Holley ([facility]).  She was able to review the patient's CT scan.  She would recommend continuing on Augmentin and will have you have them dental team call the patient today for extraction today or tomorrow.  9:12 AM Updated patient on results. Discussed and reviewed discharge plans, answered  all questions.  Patient is comfortable with the plan to follow-up with the [facility] dental team as discussed.  The patient agreed to the plan for discharge. Return precautions discussed.    At the conclusion of the encounter I discussed the results of all of the tests and the disposition. The questions were answered. The patient or family acknowledged understanding and was agreeable with the care plan.     Medical Decision Making    History:  Supplemental history from: Documented in chart, if applicable  External Record(s) reviewed: Documented in chart, if applicable. and Outpatient Record: 09/30/2023 [facility] Emergency Department    Work Up:  Chart documentation includes differential considered and any EKGs or imaging independently interpreted by provider, where specified.  In additional to work up documented, I considered the following work up: Documented in chart, if applicable.    External consultation:  Discussion of management with another provider: Documented in chart, if applicable and Other: Oral surgery    Complicating factors:  Care impacted by chronic illness: Smoking / Nicotine Use  Care affected by social determinants of health: N/A    Disposition considerations: Discharge. I prescribed additional prescription strength medication(s) as charted. See documentation for any additional details.      MEDICATIONS GIVEN IN THE EMERGENCY:  Medications   ketorolac (TORADOL) injection 15 mg (15 mg Intravenous $Given 10/2/23 0622)   clindamycin (CLEOCIN) 600 mg in 50 mL D5W intermittent infusion (0 mg Intravenous Stopped 10/2/23 0701)   HYDROmorphone (PF) (DILAUDID) injection 0.5 mg (0.5 mg Intravenous $Given 10/2/23 0700)   iopamidol (ISOVUE-370) solution 90 mL (90 mLs Intravenous $Given 10/2/23 0722)   HYDROmorphone (PF) (DILAUDID) injection 0.5 mg (0.5 mg Intravenous $Given 10/2/23 0823)       NEW PRESCRIPTIONS STARTED AT TODAY'S ER VISIT  New Prescriptions    OXYCODONE (ROXICODONE) 5 MG TABLET    Take 1 tablet  (5 mg) by mouth every 6 hours as needed for pain          =================================================================    HPI    Patient information was obtained from: patient     Use of : N/A         Isha Valderrama is a 48 year old female with a pertinent history of asthma, anxiety, tobacco use disorder and panic disorder who presents to this ED via walk in for evaluation of dental pain    Per chart review,  The patient was seen on 09/30/2023 at [facility] Emergency Department for an evaluation of dental pain. Patient reported a history of a cracked and missing tooth for the past few weeks, last evening, developed an acute onset of worsening right lower tooth pain that radiates to her right ear. Reported she was afraid to go to the dentist, however does see a dentist that she routinely follows with. Suspect this is periapical abscess versus dental infection. No obvious fluctuance or abscess that would require I&D here today in the ER. Ultimately, patient will need to follow-up with a dentist for ongoing management of tooth pain. Patient received a dental block with significant improvement in symptoms and was discharged home with with Augmentin and oxycodone.    On Friday evening (~ 4 days ago) the patient endorsed the sudden onset of persistent progressively worsening right sided dental pain. She notes that she has a broken tooth on the right side and is concerned about an abscess. Then on Saturday morning (~ 3 days ago) the patient was evaluated at [facility] Emergency Department and was started on Augmentin and Oxycodone. She notes that she has been taking the Augmentin and Oxycodone regularly as prescribed with no significant relief. In addition, she notes that she developed swelling on the right side of her jaw over the past few days. She notes that the jaw swelling is new since being evaluated and has progressively worsened since. Additionally, since yesterday the patient reports that she has  endorsed intermittent chills, sweats, and nausea. The patient notes that she has needed teeth pulled before in the past and feels similar to that but has never had pain this severe. She states that she was referred to a dentist when she was last evaluated but has not yet scheduled an appointment. The patient denies any difficulty swallowing, vomiting, fevers, or any other complaints.      PAST MEDICAL HISTORY:  Past Medical History:   Diagnosis Date    Anxiety     Kidney stone     Uncomplicated asthma        PAST SURGICAL HISTORY:  Past Surgical History:   Procedure Laterality Date    KIDNEY STONE SURGERY      Kidney Stone [place] (Dr. Ku)    KNEE SURGERY      ORTHOPEDIC SURGERY      OTHER SURGICAL HISTORY      bilateral ureter stents    TONSILLECTOMY      TONSILLECTOMY             CURRENT MEDICATIONS:    oxyCODONE (ROXICODONE) 5 MG tablet  Acetaminophen (TYLENOL PO)  ALBUTEROL IN  amoxicillin-clavulanate (AUGMENTIN) 875-125 MG tablet  Fluticasone Propionate, Inhal, (FLOVENT IN)  ibuprofen (ADVIL,MOTRIN) 200 MG tablet  LORAZEPAM PO  MedroxyPROGESTERone Acetate (DEPO-PROVERA IM)  polyethylene glycol (MIRALAX) 17 GM/Dose powder  SERTRALINE HCL PO  tamsulosin (FLOMAX) 0.4 MG capsule        ALLERGIES:  Allergies   Allergen Reactions    Alprazolam Other (See Comments)     Like a zombie   Felt like a Zombi  Like a zombie   Felt like a Zombi      Compazine [Prochlorperazine] Other (See Comments)     Dystonic reaction    Morphine Itching    Hydroxyzine Anxiety     Other reaction(s): Other, see comments  Making her feel like her skin in crawling out          FAMILY HISTORY:  Family History   Problem Relation Age of Onset    Urolithiasis Father         recurrent    Urolithiasis Paternal Aunt         recurrent    Urolithiasis Paternal Grandmother         recurrent       SOCIAL HISTORY:   Social History     Socioeconomic History    Marital status: Single   Tobacco Use    Smoking status: Every Day     Packs/day: 1.00      "Types: Cigarettes   Substance and Sexual Activity    Alcohol use: No    Drug use: No   Social History Narrative    Lives with family.       VITALS:  /62   Pulse 77   Temp 98.2  F (36.8  C) (Oral)   Resp 16   Ht 1.6 m (5' 3\")   Wt 105.2 kg (232 lb)   SpO2 96%   BMI 41.10 kg/m      PHYSICAL EXAM    Constitutional: Well developed, Well nourished, NAD  HENT: Normocephalic, Atraumatic, Bilateral external ears normal, Oropharynx normal, mucous membranes moist, Nose normal. Tender to palpation in tooth 30 with overlying soft tissue edema of the overlying lower mandible  Neck- Normal range of motion, No tenderness, Supple, No stridor.  Eyes: PERRL, EOMI, Conjunctiva normal, No discharge.   Respiratory: Normal breath sounds, No respiratory distress  Cardiovascular: Normal heart rate, Regular rhythm  Musculoskeletal: No edema. Good range of motion in all major joints. No tenderness to palpation or major deformities noted.   Integument: Warm, Dry, No erythema, No rash  Neurologic: Alert & oriented x 3, Normal motor function, Normal sensory function, No focal deficits noted. Normal gait.   Psychiatric: Affect normal, Judgment normal, Mood normal.      LAB:  All pertinent labs reviewed and interpreted.  Results for orders placed or performed during the hospital encounter of 10/02/23   Soft tissue neck CT w contrast    Impression    IMPRESSION:   1.  Large erosion in the crown of the right mandibular first molar and associated radicular cyst in this tooth's posterior root.  2.  Superimposed right facial odontogenic cellulitis. No abscess.  3.  Erosion in the crown of the left maxillary second molar without associated cellulitis.   Basic metabolic panel   Result Value Ref Range    Sodium 138 135 - 145 mmol/L    Potassium 4.2 3.4 - 5.3 mmol/L    Chloride 101 98 - 107 mmol/L    Carbon Dioxide (CO2) 29 22 - 29 mmol/L    Anion Gap 8 7 - 15 mmol/L    Urea Nitrogen 10.6 6.0 - 20.0 mg/dL    Creatinine 0.59 0.51 - 0.95 mg/dL "    GFR Estimate >90 >60 mL/min/1.73m2    Calcium 8.6 8.6 - 10.0 mg/dL    Glucose 103 (H) 70 - 99 mg/dL   Result Value Ref Range    CRP Inflammation 24.80 (H) <5.00 mg/L   CBC with platelets and differential   Result Value Ref Range    WBC Count 15.6 (H) 4.0 - 11.0 10e3/uL    RBC Count 4.26 3.80 - 5.20 10e6/uL    Hemoglobin 13.6 11.7 - 15.7 g/dL    Hematocrit 40.6 35.0 - 47.0 %    MCV 95 78 - 100 fL    MCH 31.9 26.5 - 33.0 pg    MCHC 33.5 31.5 - 36.5 g/dL    RDW 14.2 10.0 - 15.0 %    Platelet Count 414 150 - 450 10e3/uL    % Neutrophils 58 %    % Lymphocytes 24 %    % Monocytes 9 %    % Eosinophils 8 %    % Basophils 1 %    % Immature Granulocytes 0 %    NRBCs per 100 WBC 0 <1 /100    Absolute Neutrophils 9.1 (H) 1.6 - 8.3 10e3/uL    Absolute Lymphocytes 3.7 0.8 - 5.3 10e3/uL    Absolute Monocytes 1.4 (H) 0.0 - 1.3 10e3/uL    Absolute Eosinophils 1.3 (H) 0.0 - 0.7 10e3/uL    Absolute Basophils 0.1 0.0 - 0.2 10e3/uL    Absolute Immature Granulocytes 0.1 <=0.4 10e3/uL    Absolute NRBCs 0.0 10e3/uL       RADIOLOGY:  Reviewed all pertinent imaging. Please see official radiology report.  Soft tissue neck CT w contrast   Final Result   IMPRESSION:    1.  Large erosion in the crown of the right mandibular first molar and associated radicular cyst in this tooth's posterior root.   2.  Superimposed right facial odontogenic cellulitis. No abscess.   3.  Erosion in the crown of the left maxillary second molar without associated cellulitis.          I, Ana Cross, am serving as a scribe to document services personally performed by Zuleyka Ribera, based on my observation and the provider's statements to me. I, Zuleyka Ribera MD, attest that Ana Cross is acting in a scribe capacity, has observed my performance of the services and has documented them in accordance with my direction.    Zuleyka Ribera MD  Emergency Medicine  [facility] EMERGENCY ROOM  [address]  [facility] [id number] [phone]       Zuleyka Ribera MD  10/02/23 0932

## 2023-10-02 NOTE — ED TRIAGE NOTES
Patent reports that she was seen here a couple days ago.  Given antibiotics and pain meds.  Pain meds not working.  Facial swelling noted with redness     Triage Assessment       Row Name 10/02/23 0556       Triage Assessment (Adult)    Airway WDL WDL       Respiratory WDL    Respiratory WDL WDL       Skin Circulation/Temperature WDL    Skin Circulation/Temperature WDL WDL       Cardiac WDL    Cardiac WDL WDL       Peripheral/Neurovascular WDL    Peripheral Neurovascular WDL WDL       Cognitive/Neuro/Behavioral WDL    Cognitive/Neuro/Behavioral WDL WDL

## 2023-12-27 ENCOUNTER — TELEPHONE (OUTPATIENT)
Dept: UROLOGY | Facility: CLINIC | Age: 48
End: 2023-12-27
Payer: COMMERCIAL

## 2023-12-27 DIAGNOSIS — N20.0 CALCULUS OF KIDNEY: Primary | ICD-10-CM

## 2023-12-27 NOTE — TELEPHONE ENCOUNTER
Spoke with patient who is feeling well, she will go in for an ultrasound to make sure her hydronephrosis is resolved.  Will follow up with her with results.  Aby Riley RN

## 2024-01-11 ENCOUNTER — TELEPHONE (OUTPATIENT)
Dept: UROLOGY | Facility: CLINIC | Age: 49
End: 2024-01-11
Payer: COMMERCIAL

## 2024-01-11 NOTE — TELEPHONE ENCOUNTER
Message left for patient to call back to reschedule ultrasound that was missed.  Provider request to ensure that her recent stone has passed.  Number for nurse and radiology given.  Aby Riley RN

## 2024-01-17 ENCOUNTER — APPOINTMENT (OUTPATIENT)
Dept: CT IMAGING | Facility: CLINIC | Age: 49
End: 2024-01-17
Attending: EMERGENCY MEDICINE
Payer: COMMERCIAL

## 2024-01-17 ENCOUNTER — HOSPITAL ENCOUNTER (EMERGENCY)
Facility: CLINIC | Age: 49
Discharge: HOME | End: 2024-01-17
Attending: EMERGENCY MEDICINE | Admitting: EMERGENCY MEDICINE
Payer: COMMERCIAL

## 2024-01-17 VITALS
BODY MASS INDEX: 41.64 KG/M2 | HEIGHT: 63 IN | SYSTOLIC BLOOD PRESSURE: 139 MMHG | OXYGEN SATURATION: 97 % | WEIGHT: 235 LBS | HEART RATE: 61 BPM | TEMPERATURE: 97.1 F | DIASTOLIC BLOOD PRESSURE: 89 MMHG | RESPIRATION RATE: 16 BRPM

## 2024-01-17 DIAGNOSIS — R10.9 FLANK PAIN: ICD-10-CM

## 2024-01-17 LAB
ALBUMIN UR-MCNC: NEGATIVE MG/DL
APPEARANCE UR: CLEAR
BACTERIA #/AREA URNS HPF: ABNORMAL /HPF
BILIRUB UR QL STRIP: NEGATIVE
COLOR UR AUTO: YELLOW
GLUCOSE UR STRIP-MCNC: NEGATIVE MG/DL
HGB UR QL STRIP: ABNORMAL
KETONES UR STRIP-MCNC: NEGATIVE MG/DL
LEUKOCYTE ESTERASE UR QL STRIP: NEGATIVE
MUCOUS THREADS #/AREA URNS LPF: PRESENT /LPF
NITRATE UR QL: NEGATIVE
PH UR STRIP: 6 [PH] (ref 5–7)
RBC URINE: 14 /HPF
SP GR UR STRIP: 1.02 (ref 1–1.03)
SQUAMOUS EPITHELIAL: 2 /HPF
UROBILINOGEN UR STRIP-MCNC: <2 MG/DL
WBC URINE: 1 /HPF

## 2024-01-17 PROCEDURE — 250N000013 HC RX MED GY IP 250 OP 250 PS 637: Performed by: EMERGENCY MEDICINE

## 2024-01-17 PROCEDURE — 250N000011 HC RX IP 250 OP 636: Performed by: EMERGENCY MEDICINE

## 2024-01-17 PROCEDURE — 81001 URINALYSIS AUTO W/SCOPE: CPT | Performed by: EMERGENCY MEDICINE

## 2024-01-17 PROCEDURE — 74176 CT ABD & PELVIS W/O CONTRAST: CPT

## 2024-01-17 PROCEDURE — 99285 EMERGENCY DEPT VISIT HI MDM: CPT | Mod: 25

## 2024-01-17 PROCEDURE — 96372 THER/PROPH/DIAG INJ SC/IM: CPT | Performed by: EMERGENCY MEDICINE

## 2024-01-17 RX ORDER — ONDANSETRON 4 MG/1
4 TABLET, ORALLY DISINTEGRATING ORAL ONCE
Status: COMPLETED | OUTPATIENT
Start: 2024-01-17 | End: 2024-01-17

## 2024-01-17 RX ORDER — KETOROLAC TROMETHAMINE 30 MG/ML
30 INJECTION, SOLUTION INTRAMUSCULAR; INTRAVENOUS ONCE
Status: COMPLETED | OUTPATIENT
Start: 2024-01-17 | End: 2024-01-17

## 2024-01-17 RX ORDER — ACETAMINOPHEN 325 MG/1
975 TABLET ORAL ONCE
Status: COMPLETED | OUTPATIENT
Start: 2024-01-17 | End: 2024-01-17

## 2024-01-17 RX ADMIN — Medication 50 MG: at 13:17

## 2024-01-17 RX ADMIN — ONDANSETRON 4 MG: 4 TABLET, ORALLY DISINTEGRATING ORAL at 13:14

## 2024-01-17 RX ADMIN — KETOROLAC TROMETHAMINE 30 MG: 30 INJECTION, SOLUTION INTRAMUSCULAR at 14:22

## 2024-01-17 RX ADMIN — ACETAMINOPHEN 975 MG: 325 TABLET ORAL at 14:23

## 2024-01-17 ASSESSMENT — ACTIVITIES OF DAILY LIVING (ADL): ADLS_ACUITY_SCORE: 35

## 2024-01-17 NOTE — ED TRIAGE NOTES
Patient with hx of kidney stones presents to ED with L sided flank pain last night, pt took 800 mg of Advil @1000 this morning and 1000 mg of Tylenol @1200, patient is nauseous.  Marva Ramos RN.......1/17/2024 1:14 PM     Triage Assessment (Adult)       Row Name 01/17/24 1313          Triage Assessment    Airway WDL WDL        Respiratory WDL    Respiratory WDL WDL        Skin Circulation/Temperature WDL    Skin Circulation/Temperature WDL WDL        Cardiac WDL    Cardiac WDL WDL        Peripheral/Neurovascular WDL    Peripheral Neurovascular WDL WDL        Cognitive/Neuro/Behavioral WDL    Cognitive/Neuro/Behavioral WDL WDL

## 2024-01-17 NOTE — DISCHARGE INSTRUCTIONS
No stones identified on the CAT scan  Your urinalysis had a small amount of blood.  This should be followed up as outpatient

## 2024-01-17 NOTE — ED PROVIDER NOTES
EMERGENCY DEPARTMENT ENCOUNTER            IMPRESSION:  Flank pain        MEDICAL DECISION MAKING:  It was my pleasure to provide care for Isha Valderrama who presented for evaluation of left-sided flank pain.  She has a history of kidney stones    On my exam patient is pleasant and cooperative.   Vital signs show hypertension.  Physical exam notable for patient to be uncomfortable.  No abdominal or flank tenderness.     Toradol administered for symptom relief.  Patient's symptoms improved.       Laboratory investigation independently interpreted and notable for urinalysis demonstrates hematuria    CT imaging of the abdomen was ordered.  Does not show evidence of kidney stone    Unclear etiology of her pain.  She may have passed a stone    =================================================================  CHIEF COMPLAINT:  Chief Complaint   Patient presents with    Flank Pain         HPI  Isha Valderrama is a 48 year old female with a history of nephrolithiasis, who presents to the ED by walk-in for evaluation of flank pain.    Last night, the patient developed left sided flank pain that feels like pain with previous kidney stones. At 1000h today (4 hours ago), the patient took ibuprofen (800 mg); at 1200h (2 hours ago), the patient took Tylenol (1000 mg). She states that she notes hematuria, but denies dysuria. She denies pregnancy chance.    Patient previously had surgical intervention with kidney stones.    REVIEW OF SYSTEMS  Constitutional: Does not report chills, unintentional weight loss or fatigue   Eyes: Does not report visual changes or discharge    HENT: Does not report sore throat, ear pain or neck pain  Respiratory: Does not report cough or shortness of breath    Cardiovascular: Does not report chest pain, palpitations or leg swelling  GI: Does not report abdominal pain, nausea, vomiting, or dark, bloody stools.    : Does not report dysuria. Reports flank pain, hematuria.  Musculoskeletal: Does  "not report any new musculoskeletal pain or new muscle/joint pains  Skin: Does not report rash or wound  Neurologic: Does not report current headache, new weakness, focal weakness, or sensory changes        Remainder of systems reviewed, unless noted in HPI all others negative.      PAST MEDICAL HISTORY:  Past Medical History:   Diagnosis Date    Anxiety     Kidney stone     Uncomplicated asthma        PAST SURGICAL HISTORY:  Past Surgical History:   Procedure Laterality Date    KIDNEY STONE SURGERY      Kidney Stone [place] (Dr. Ku)    KNEE SURGERY      ORTHOPEDIC SURGERY      OTHER SURGICAL HISTORY      bilateral ureter stents    TONSILLECTOMY      TONSILLECTOMY           CURRENT MEDICATIONS:    Acetaminophen (TYLENOL PO)  ALBUTEROL IN  Fluticasone Propionate, Inhal, (FLOVENT IN)  ibuprofen (ADVIL,MOTRIN) 200 MG tablet  LORAZEPAM PO  MedroxyPROGESTERone Acetate (DEPO-PROVERA IM)  polyethylene glycol (MIRALAX) 17 GM/Dose powder  SERTRALINE HCL PO  tamsulosin (FLOMAX) 0.4 MG capsule        ALLERGIES:  Allergies   Allergen Reactions    Alprazolam Other (See Comments)     Like a zombie   Felt like a Zombi  Like a zombie   Felt like a Zombi      Compazine [Prochlorperazine] Other (See Comments)     Dystonic reaction    Morphine Itching       FAMILY HISTORY:  Family History   Problem Relation Age of Onset    Urolithiasis Father         recurrent    Urolithiasis Paternal Aunt         recurrent    Urolithiasis Paternal Grandmother         recurrent       SOCIAL HISTORY:   Social History     Socioeconomic History    Marital status: Single   Tobacco Use    Smoking status: Every Day     Packs/day: 1     Types: Cigarettes   Substance and Sexual Activity    Alcohol use: No    Drug use: No   Social History Narrative    Lives with family.       PHYSICAL EXAM:    BP (!) 187/77   Pulse 77   Temp 97.1  F (36.2  C) (Temporal)   Resp 16   Ht 1.6 m (5' 3\")   Wt 106.6 kg (235 lb)   LMP 12/03/2023   SpO2 97%   BMI 41.63 " kg/m      Constitutional: Awake, alert, she is uncomfortable  Head: Normocephalic, atraumatic.  ENT: Mucous membranes are moist.  No pallor.   Eyes: Pupils are reactive.  No discoloration.  Neck: No lymphadenopathy, no stridor, supple, no soft tissue swelling  Chest: No tenderness   Respiratory: Respirations even, unlabored. Lungs clear to ascultation bilaterally, in no acute respiratory distress.  Cardiovascular: Regular rate and rhythm.  Good overall perfusion.  Upper and lower extremity pulses are equal.  GI: Abdomen soft, non-tender to palpation.  No guarding or rebound. Bowel sounds present throughout.   Back: No CVA tenderness.    Musculoskeletal: Moves all 4 extremities equally, full function and capacity no peripheral edema.   Integument: Warm, dry. No rash. No bruising or petechiae.  Neurologic: Alert & oriented x 3. Normal speech.   Psychiatric: Normal mood and affect.  Appropriate judgement.    ED COURSE:  1:55 PM I met the patient and performed my initial interview and exam.         Medical Decision Making    History:  Supplemental history from: N/A  External Record(s) reviewed: External medical records including care everywhere reviewed. Patient presented to [facility] ER on 14-Jun-2023 for evaluation of flank pain. She had a known left-sided stone at this time and was given medication to control the pain. On 07-Jul-2023, the patient called [facility] to cancel a removal surgery as she had passed the stone.    Work Up:  EKG, laboratory and imaging studies as ordered were independently interpreted by myself.   Broad differential diagnosis considered for flank pain  The patient's presentation was of moderate complexity.       Complicating factors:  Patient has a complicated past medical history including nephrolithiasis, anxiety, depression, asthma  Care affected by social determinants of health: Access to primary care        LAB:  Laboratory results were independently reviewed and  interpreted  Results for orders placed or performed during the hospital encounter of 01/17/24   CT Abdomen Pelvis w/o Contrast    Impression    IMPRESSION:   1.  Nonobstructing bilateral nephrolithiasis.  2.  No ureteral calculi or hydronephrosis.     UA with Microscopic reflex to Culture    Specimen: Urine, Clean Catch   Result Value Ref Range    Color Urine Yellow Colorless, Straw, Light Yellow, Yellow    Appearance Urine Clear Clear    Glucose Urine Negative Negative mg/dL    Bilirubin Urine Negative Negative    Ketones Urine Negative Negative mg/dL    Specific Gravity Urine 1.024 1.001 - 1.030    Blood Urine 0.03 mg/dL (A) Negative    pH Urine 6.0 5.0 - 7.0    Protein Albumin Urine Negative Negative mg/dL    Urobilinogen Urine <2.0 <2.0 mg/dL    Nitrite Urine Negative Negative    Leukocyte Esterase Urine Negative Negative    Bacteria Urine Few (A) None Seen /HPF    Mucus Urine Present (A) None Seen /LPF    RBC Urine 14 (H) <=2 /HPF    WBC Urine 1 <=5 /HPF    Squamous Epithelials Urine 2 (H) <=1 /HPF         RADIOLOGY:  Radiology reports were independently reviewed and interpreted  CT Abdomen Pelvis w/o Contrast   Final Result   IMPRESSION:    1.  Nonobstructing bilateral nephrolithiasis.   2.  No ureteral calculi or hydronephrosis.              EKG:    ECG results from 03/04/18   EKG 12 lead     Value    Interpretation ECG Click View Image link to view waveform and result       I have independently reviewed and interpreted the EKG(s) documented above.        MEDICATIONS GIVEN IN THE EMERGENCY:  Medications   ondansetron (ZOFRAN ODT) ODT tab 4 mg (4 mg Oral $Given 1/17/24 1314)   dimenhyDRINATE chew tab 50 mg (50 mg Oral $Given 1/17/24 1317)   ketorolac (TORADOL) injection 30 mg (30 mg Intramuscular $Given 1/17/24 1422)   acetaminophen (TYLENOL) tablet 975 mg (975 mg Oral $Given 1/17/24 1423)           NEW PRESCRIPTIONS STARTED AT TODAY'S ER VISIT:  New Prescriptions    No medications on file                FINAL  DIAGNOSIS:    ICD-10-CM    1. Flank pain  R10.9                  NAME: Isha Valderrama  AGE: 48 year old female  YOB: 1975  MRN: [id number]  EVALUATION DATE & TIME: No admission date for patient encounter.    PCP: Dewayne Barrera    ED PROVIDER: Roderick Garibay M.D.      Gilmar KIDD, am serving as a scribe to document services personally performed by Dr. Roderick Garibay based on my observation and the provider's statements to me. I, Roderick Garibay MD attest that Gilmar Fraga is acting in a scribe capacity, has observed my performance of the services and has documented them in accordance with my direction.    Roderick Garibay M.D.  Emergency Medicine  [facility] EMERGENCY ROOM  [address] [address]  [phone]  Dept: [phone]  1/17/2024         Roderick Garibay MD  01/17/24 1547

## 2024-06-21 ENCOUNTER — HOSPITAL ENCOUNTER (EMERGENCY)
Facility: CLINIC | Age: 49
Discharge: HOME | End: 2024-06-21
Attending: EMERGENCY MEDICINE | Admitting: EMERGENCY MEDICINE
Payer: COMMERCIAL

## 2024-06-21 VITALS
RESPIRATION RATE: 19 BRPM | DIASTOLIC BLOOD PRESSURE: 85 MMHG | OXYGEN SATURATION: 98 % | SYSTOLIC BLOOD PRESSURE: 168 MMHG | TEMPERATURE: 97.9 F | HEART RATE: 70 BPM

## 2024-06-21 DIAGNOSIS — K04.7 DENTAL INFECTION: ICD-10-CM

## 2024-06-21 PROCEDURE — 99283 EMERGENCY DEPT VISIT LOW MDM: CPT

## 2024-06-21 PROCEDURE — 250N000013 HC RX MED GY IP 250 OP 250 PS 637: Performed by: EMERGENCY MEDICINE

## 2024-06-21 RX ORDER — PENICILLIN V POTASSIUM 250 MG/1
500 TABLET, FILM COATED ORAL ONCE
Status: COMPLETED | OUTPATIENT
Start: 2024-06-21 | End: 2024-06-21

## 2024-06-21 RX ORDER — PENICILLIN V POTASSIUM 500 MG/1
500 TABLET, FILM COATED ORAL 2 TIMES DAILY
Qty: 14 TABLET | Refills: 0 | Status: SHIPPED | OUTPATIENT
Start: 2024-06-21 | End: 2024-06-28

## 2024-06-21 RX ADMIN — PENICILLIN V POTASSIUM 500 MG: 250 TABLET, FILM COATED ORAL at 03:14

## 2024-06-21 ASSESSMENT — ACTIVITIES OF DAILY LIVING (ADL): ADLS_ACUITY_SCORE: 33

## 2024-06-21 NOTE — DISCHARGE INSTRUCTIONS
Many of these clinics offer a sliding fee option for patients that qualify, and see patients on a walk-in or same day basis. Please call each clinic directly. As services, hours, fees and policies vary greatly.    [place]:  [facility]     [phone]  [facility] ([facility]) [phone]  [facility]  [phone]  [facility]      [phone]   [facility]    [phone]  [facility]  [phone]  [facility] [phone]  [facility]     [phone]  [facility]   [phone]  [facility] (cash only)   [phone]  [facility]    [phone] (adults)          [phone] (children)    [place]:  [facility]     [phone]; [phone]  [facility]     [phone]  [facility]     [phone]  [facility] (free, limited)    [phone]    Multiple Locations:  [facility]       [phone]

## 2024-06-21 NOTE — ED PROVIDER NOTES
EMERGENCY DEPARTMENT ENCOUNTER      NAME: Isha Valderrama  AGE: 48 year old female  YOB: 1975  MRN: [id number]  EVALUATION DATE & TIME: No admission date for patient encounter.    PCP: Dewayne Barrera    ED PROVIDER: Vishal Tello M.D.      Chief Complaint   Patient presents with    Dental Pain    Otalgia         FINAL IMPRESSION:  1. Dental infection          ED COURSE & MEDICAL DECISION MAKING:    Pertinent Labs & Imaging studies reviewed. (See chart for details)  48 year old female presents to the Emergency Department for evaluation of left-sided face pain.  Does have some swelling of her jaw.  Overall poor dentition.  I suspect dental infection.  Given penicillin here.  No signs of deeper infection.  Notification for imaging.  TM is clear.  Patient will follow-up with a dentist soon as she can.  Will return for worsening symptoms.    2:56 AM I met with the patient to gather history and to perform my initial exam. I discussed the plan for care while in the Emergency Department.       At the conclusion of the encounter I discussed the results of all of the tests and the disposition. The questions were answered. The patient or family acknowledged understanding and was agreeable with the care plan.     Medical Decision Making  Obtained supplemental history:Supplemental history obtained?: No  Reviewed external records: External records reviewed?: Documented in chart and Outpatient Record: [facility] visit on 05/27/2024  Care impacted by chronic illness:Chronic Lung Disease and Mental Health  Care significantly affected by social determinants of health:Access to Medical Care  Did you consider but not order tests?: Work up considered but not performed and documented in chart, if applicable  Did you interpret images independently?: Independent interpretation of ECG and images noted in documentation, when applicable.  Consultation discussion with other provider:Did you involve another provider  (consultant, MH, pharmacy, etc.)?: No  Discharge. I prescribed additional prescription strength medication(s) as charted. See documentation for any additional details.         MEDICATIONS GIVEN IN THE EMERGENCY:  Medications   penicillin V (VEETID) tablet 500 mg (500 mg Oral $Given 6/21/24 0314)       NEW PRESCRIPTIONS STARTED AT TODAY'S ER VISIT  Discharge Medication List as of 6/21/2024  3:12 AM        START taking these medications    Details   penicillin V (VEETID) 500 MG tablet Take 1 tablet (500 mg) by mouth 2 times daily for 7 days, Disp-14 tablet, R-0, Local Print                =================================================================    HPI    Patient information was obtained from: The patient    Use of : N/A         Isha Valderrama is a 48 year old female with a pertinent history of asthma, anxiety who presents to this ED for evaluation of dental pain and otalgia.    Per chart review, the patient was seen in the [facility] [place] on 05/27/2024 for an evaluation of dental pain from a broken tooth in her bottom left jaw and developed pain to a tooth in her upper left jaw. She was advised to use Tylenol and motrin for pain. She was prescribed 10 day course of penicillin 500 mg tablet. Plan to follow-up with her dentist.    The patient developed left lower dental pain around 8 PM last night and has been managing her pain with ibuprofen and Tylenol without any relief. She further notes having a broken tooth to her left lower jaw and is also complaining of left upper dental pain at this time. She associates left-sided facial swelling, left otalgia, and nausea with her pain. She is allergic to morphine. She is planning to be go to the [facility] dental clinic after her ER visit. No complaints of fevers or any other medical complaints at this time.        PAST MEDICAL HISTORY:  Past Medical History:   Diagnosis Date    Anxiety     Kidney stone     Uncomplicated asthma        PAST  SURGICAL HISTORY:  Past Surgical History:   Procedure Laterality Date    KIDNEY STONE SURGERY      Kidney Stone [place] (Dr. Ku)    KNEE SURGERY      ORTHOPEDIC SURGERY      OTHER SURGICAL HISTORY      bilateral ureter stents    TONSILLECTOMY      TONSILLECTOMY             CURRENT MEDICATIONS:    No current facility-administered medications for this encounter.     Current Outpatient Medications   Medication Sig Dispense Refill    penicillin V (VEETID) 500 MG tablet Take 1 tablet (500 mg) by mouth 2 times daily for 7 days 14 tablet 0    Acetaminophen (TYLENOL PO) Take 1,000 mg by mouth every 6 hours as needed      ALBUTEROL IN       Fluticasone Propionate, Inhal, (FLOVENT IN)       ibuprofen (ADVIL,MOTRIN) 200 MG tablet Take 3 tablets (600 mg) by mouth every 8 hours as needed for mild pain (Patient taking differently: Take 800 mg by mouth every 6 hours as needed for mild pain) 60 tablet 0    LORAZEPAM PO       MedroxyPROGESTERone Acetate (DEPO-PROVERA IM)       polyethylene glycol (MIRALAX) 17 GM/Dose powder Take 17 g (1 Capful) by mouth daily 238 g 0    SERTRALINE HCL PO Take by mouth daily      tamsulosin (FLOMAX) 0.4 MG capsule Take 1 capsule (0.4 mg) by mouth daily 30 capsule 1         ALLERGIES:  Allergies   Allergen Reactions    Alprazolam Other (See Comments)     Like a zombie   Felt like a Zombi  Like a zombie   Felt like a Zombi      Compazine [Prochlorperazine] Other (See Comments)     Dystonic reaction    Morphine Itching       FAMILY HISTORY:  Family History   Problem Relation Age of Onset    Urolithiasis Father         recurrent    Urolithiasis Paternal Aunt         recurrent    Urolithiasis Paternal Grandmother         recurrent       SOCIAL HISTORY:   Social History     Socioeconomic History    Marital status: Single   Tobacco Use    Smoking status: Every Day     Current packs/day: 1.00     Types: Cigarettes   Substance and Sexual Activity    Alcohol use: No    Drug use: No   Social History  Narrative    Lives with family.       VITALS:  BP (!) 168/85   Pulse 70   Temp 97.9  F (36.6  C)   Resp 19   SpO2 98%     PHYSICAL EXAM    Physical Exam  Constitutional:       General: She is not in acute distress.     Appearance: Normal appearance. She is well-developed.   HENT:      Head: Normocephalic and atraumatic.      Left Ear: Tympanic membrane normal.      Mouth/Throat:      Comments: Swelling over left cheek.  Overall poor dentition.  Some tenderness of both upper and lower teeth.  Multiple caries.  Eyes:      General: No scleral icterus.     Conjunctiva/sclera: Conjunctivae normal.   Cardiovascular:      Rate and Rhythm: Normal rate.   Pulmonary:      Effort: Pulmonary effort is normal. No respiratory distress.   Abdominal:      General: Abdomen is flat.   Musculoskeletal:      Cervical back: Normal range of motion and neck supple.   Skin:     General: Skin is warm and dry.      Findings: No rash.   Neurological:      Mental Status: She is alert and oriented to person, place, and time.           LAB:  All pertinent labs reviewed and interpreted.  Labs Ordered and Resulted from Time of ED Arrival to Time of ED Departure - No data to display    RADIOLOGY:  Reviewed all pertinent imaging. Please see official radiology report.  No orders to display       EKG:    none    PROCEDURES:   N/A      I, Copoer Cash, am serving as a scribe to document services personally performed by Dr. Vishal Tello, based on my observation and the provider's statements to me. IVishal MD attest that Cooper Cash is acting in a scribe capacity, has observed my performance of the services and has documented them in accordance with my direction.    Vishal Tello M.D.  Emergency Medicine  [facility] EMERGENCY ROOM  [address] [id number] [phone]  Dept: [phone]       Vishal Tello MD  06/21/24 3062

## 2024-06-21 NOTE — ED TRIAGE NOTES
Pt reporting left side mouth pain and ear pain. Teeth and ear feels like it is throbbing. Already took ibuprofen and tylenol.      Triage Assessment (Adult)       Row Name 06/21/24 0255          Triage Assessment    Airway WDL WDL        Respiratory WDL    Respiratory WDL WDL        Skin Circulation/Temperature WDL    Skin Circulation/Temperature WDL WDL        Cardiac WDL    Cardiac WDL WDL        Peripheral/Neurovascular WDL    Peripheral Neurovascular WDL WDL        Cognitive/Neuro/Behavioral WDL    Cognitive/Neuro/Behavioral WDL WDL

## 2024-07-19 ENCOUNTER — HOSPITAL ENCOUNTER (EMERGENCY)
Facility: CLINIC | Age: 49
Discharge: HOME | End: 2024-07-19
Admitting: PHYSICIAN ASSISTANT
Payer: COMMERCIAL

## 2024-07-19 ENCOUNTER — APPOINTMENT (OUTPATIENT)
Dept: CT IMAGING | Facility: CLINIC | Age: 49
End: 2024-07-19
Attending: PHYSICIAN ASSISTANT
Payer: COMMERCIAL

## 2024-07-19 ENCOUNTER — TELEPHONE (OUTPATIENT)
Dept: UROLOGY | Facility: CLINIC | Age: 49
End: 2024-07-19

## 2024-07-19 VITALS
RESPIRATION RATE: 16 BRPM | WEIGHT: 250 LBS | DIASTOLIC BLOOD PRESSURE: 79 MMHG | HEIGHT: 63 IN | SYSTOLIC BLOOD PRESSURE: 156 MMHG | HEART RATE: 71 BPM | TEMPERATURE: 98.4 F | OXYGEN SATURATION: 99 % | BODY MASS INDEX: 44.3 KG/M2

## 2024-07-19 DIAGNOSIS — R10.9 LEFT FLANK PAIN: ICD-10-CM

## 2024-07-19 DIAGNOSIS — N20.0 LEFT NEPHROLITHIASIS: ICD-10-CM

## 2024-07-19 LAB
ALBUMIN UR-MCNC: 20 MG/DL
APPEARANCE UR: CLEAR
BILIRUB UR QL STRIP: NEGATIVE
COLOR UR AUTO: YELLOW
GLUCOSE UR STRIP-MCNC: NEGATIVE MG/DL
HCG UR QL: NEGATIVE
HGB UR QL STRIP: ABNORMAL
KETONES UR STRIP-MCNC: NEGATIVE MG/DL
LEUKOCYTE ESTERASE UR QL STRIP: NEGATIVE
MUCOUS THREADS #/AREA URNS LPF: PRESENT /LPF
NITRATE UR QL: NEGATIVE
PH UR STRIP: 6.5 [PH] (ref 5–7)
RBC URINE: 38 /HPF
SP GR UR STRIP: 1.02 (ref 1–1.03)
SQUAMOUS EPITHELIAL: 11 /HPF
UROBILINOGEN UR STRIP-MCNC: <2 MG/DL
WBC URINE: 1 /HPF

## 2024-07-19 PROCEDURE — 81025 URINE PREGNANCY TEST: CPT | Performed by: EMERGENCY MEDICINE

## 2024-07-19 PROCEDURE — 81001 URINALYSIS AUTO W/SCOPE: CPT | Performed by: EMERGENCY MEDICINE

## 2024-07-19 PROCEDURE — 99284 EMERGENCY DEPT VISIT MOD MDM: CPT | Mod: 25

## 2024-07-19 PROCEDURE — 74176 CT ABD & PELVIS W/O CONTRAST: CPT

## 2024-07-19 RX ORDER — KETOROLAC TROMETHAMINE 15 MG/ML
15 INJECTION, SOLUTION INTRAMUSCULAR; INTRAVENOUS ONCE
Status: COMPLETED | OUTPATIENT
Start: 2024-07-19 | End: 2024-07-19

## 2024-07-19 RX ORDER — DIMENHYDRINATE 50 MG
50 TABLET ORAL EVERY 6 HOURS PRN
Qty: 28 TABLET | Refills: 0 | Status: SHIPPED | OUTPATIENT
Start: 2024-07-19 | End: 2024-07-26

## 2024-07-19 RX ORDER — DIMENHYDRINATE 50 MG
50 TABLET ORAL AT BEDTIME
Qty: 7 TABLET | Refills: 0 | Status: SHIPPED | OUTPATIENT
Start: 2024-07-19 | End: 2024-07-26

## 2024-07-19 RX ORDER — OXYCODONE HYDROCHLORIDE 5 MG/1
5 TABLET ORAL EVERY 4 HOURS PRN
Qty: 12 TABLET | Refills: 0 | Status: SHIPPED | OUTPATIENT
Start: 2024-07-19 | End: 2024-07-21

## 2024-07-19 RX ORDER — TAMSULOSIN HYDROCHLORIDE 0.4 MG/1
0.4 CAPSULE ORAL DAILY
Qty: 10 CAPSULE | Refills: 0 | Status: SHIPPED | OUTPATIENT
Start: 2024-07-19 | End: 2024-07-29

## 2024-07-19 ASSESSMENT — COLUMBIA-SUICIDE SEVERITY RATING SCALE - C-SSRS
1. IN THE PAST MONTH, HAVE YOU WISHED YOU WERE DEAD OR WISHED YOU COULD GO TO SLEEP AND NOT WAKE UP?: NO
2. HAVE YOU ACTUALLY HAD ANY THOUGHTS OF KILLING YOURSELF IN THE PAST MONTH?: NO
6. HAVE YOU EVER DONE ANYTHING, STARTED TO DO ANYTHING, OR PREPARED TO DO ANYTHING TO END YOUR LIFE?: NO

## 2024-07-19 ASSESSMENT — ACTIVITIES OF DAILY LIVING (ADL): ADLS_ACUITY_SCORE: 35

## 2024-07-19 NOTE — TELEPHONE ENCOUNTER
[facility] Call Center    Phone Message    May a detailed message be left on voicemail: yes     Reason for Call: Other: Patient has 7 kidney stones, waiting for referral to be triaged so writer cannot schedule, patient is wanting to be called back asap to discuss and get her on the books. She is hoping for something on the 22nd due to the pain and only 10 pain pills      Action Taken: Message routed to:  Other: uro    Travel Screening: Not Applicable     Date of Service:

## 2024-07-19 NOTE — DISCHARGE INSTRUCTIONS
You were seen here in the ED for evaluation of left sided flank pain, your urine does not show signs of infection, you do have a 6.5mm stone in the left ureter which is likely the source of your pain. Prescriptions for additional pain medications, Flomax, and dimenhydrinate were sent to your pharmacy- I did discuss with the on call [facility] provider, and they recommend outpatient follow up, I have placed a referral and they will call you- please return to the ED with worsening symptoms or fever.     For pain or fever you may use:  -Tylenol 650 mg every 6 hours.  Max 4000 mg in 24 hours  Do not use thismedication with alcohol as it can cause liver problems.  -Ibuprofen 600 mg every 6 hours.  Max 3500 mg in 24 hours  Do not take this medication if you have a history of a GI bleed or have kidney problems.  You may use both of these medications at the same time or you can alternate them every 3 hours.  For example, Tylenol at 6 AM, ibuprofen at 9 AM, Tylenol at noon, etc.

## 2024-07-19 NOTE — ED TRIAGE NOTES
Left flank pain with a history of kidney stones.  Taking Tylenol and muscle relaxors without relief.     Triage Assessment (Adult)       Row Name 07/19/24 0842          Triage Assessment    Airway WDL WDL        Respiratory WDL    Respiratory WDL WDL        Skin Circulation/Temperature WDL    Skin Circulation/Temperature WDL WDL        Cardiac WDL    Cardiac WDL WDL        Peripheral/Neurovascular WDL    Peripheral Neurovascular WDL WDL        Cognitive/Neuro/Behavioral WDL    Cognitive/Neuro/Behavioral WDL WDL

## 2024-07-19 NOTE — ED PROVIDER NOTES
EMERGENCY DEPARTMENT ENCOUNTER      NAME: Isha Valderrama  AGE: 48 year old female  YOB: 1975  MRN: [id number]  EVALUATION DATE & TIME: No admission date for patient encounter.    PCP: Dewayne Barrera    ED PROVIDER: Fady Perez PA-C      Chief Complaint   Patient presents with    Flank Pain         FINAL IMPRESSION:  1. Left flank pain    2. Left nephrolithiasis          ED COURSE & MEDICAL DECISION MAKING:    Pertinent Labs & Imaging studies reviewed. (See chart for details)  8:46 AM I met the patient and performed my initial interview and exam.  10:13 AM Spoke with [facility] Dr. Apple, who recommends outpatient follow-up.   10:15 AM Patient to be discharged, agreeable with plan, scripts sent to pharmacy.     48 year old female presents to the Emergency Department for evaluation of flank pain.     ED Course as of 07/19/24 1015   Fri Jul 19, 2024   0854 Patient is a 47Yo female past medical history of kidney stones, anxiety who presents to the ED for evaluation of left flank pain. Pain present for the last 2 days. Patient has long history of stones, typically can pass on own, but has seen [facility]. No fevers, some blood in urine. On exam mild left sided flank tenderness, no anterior abd tenderness rebound or guarding. No vomiting. Has taken tylenol, ibuprofen, and msk relaxers at home with minimal relief. Otherwise denies complaints. Plan for UA to R/O septic stone, as well as CT stone protocol to visualize. Will be given toradol for pain relief. Patient agreeable.    0902 UA here does not appear consistent with infection     0917 I have independently reviewed the CT abd pelvis, there does appear to be a stone in the left kidney, no hydro. Pending final radiology read.   0927 Abd/pelvis CT no contrast - Stone Protocol  6.5 mm stone in the proximal left ureter with mild left-sided hydronephrosis. There are a few additional small stones in the left kidney ranging in size from 1 to 2 mm. A few  tiny punctate nonobstructing stones in the right kidney.   0929 Given size of stone and unlikely nature of passing, will page out to [facility] regarding recommendations.    0949     Updated by nursing that the patient is not requesting discharge. Will send prescriptions to the pharmacy, would prefer her to wait until discussion with [facility]. Patient informed. She declined the toradol injection here.    1012 Spoke with [facility] who recommends outpatient follow up. Patient to be discharged with prescriptions for pain medications and flomax, referred to [facility] for likely intervention given size of stone, but given lack of infection here, [facility] not recommending emergent intervention       Medical Decision Making  Obtained supplemental history:Supplemental history obtained?: No  Reviewed external records: External records reviewed?: Outpatient Record: Outpatient ED visit on 06/21/2024, outpatient ED visit on 05/27/2024  Care impacted by chronic illness:Chronic Pain, Mental Health, and Other: Kidney stone  Care significantly affected by social determinants of health:Access to Medical Care  Did you consider but not order tests?: Work up considered but not performed and documented in chart, if applicable  Did you interpret images independently?: Independent interpretation of ECG and images noted in documentation, when applicable.  Consultation discussion with other provider:Did you involve another provider (consultant, MH, pharmacy, etc.)?: No  Discharge. I prescribed additional prescription strength medication(s) as charted. N/A.         At the conclusion of the encounter I discussed the results of all of the tests and the disposition. The questions were answered. The patient or family acknowledged understanding and was agreeable with the care plan.       0 minutes of critical care time     MEDICATIONS GIVEN IN THE EMERGENCY:  Medications   ketorolac (TORADOL) injection 15 mg (15 mg Intramuscular Not Given 7/19/24 0947)       NEW  PRESCRIPTIONS STARTED AT TODAY'S ER VISIT  New Prescriptions    DIMENHYDRINATE (DRAMAMINE) 50 MG TABLET    Take 1 tablet (50 mg) by mouth at bedtime for 7 days    DIMENHYDRINATE (DRAMAMINE) 50 MG TABLET    Take 1 tablet (50 mg) by mouth every 6 hours as needed for other (kidney stone pain management)    OXYCODONE (ROXICODONE) 5 MG TABLET    Take 1 tablet (5 mg) by mouth every 4 hours as needed for severe pain If pain is not improved with acetaminophen and ibuprofen.    TAMSULOSIN (FLOMAX) 0.4 MG CAPSULE    Take 1 capsule (0.4 mg) by mouth daily for 10 doses          =================================================================    HPI    Patient information was obtained from: Patient     Use of : N/A        Isha Valderrama is a 48 year old female with a pertinent history of low back pain, anxiety, panic disorder, kidney stones, who presents to this ED  for evaluation of left sided flank pain. Currently taking tylenol and muscle relaxer at home with minimal relief, no fevers. No vomiting, no change in bowel. Feels very similar to previous episodes, has seen [facility] in the past.      PAST MEDICAL HISTORY:  Past Medical History:   Diagnosis Date    Anxiety     Kidney stone     Uncomplicated asthma        PAST SURGICAL HISTORY:  Past Surgical History:   Procedure Laterality Date    KIDNEY STONE SURGERY      Kidney Stone [place] (Dr. Ku)    KNEE SURGERY      ORTHOPEDIC SURGERY      OTHER SURGICAL HISTORY      bilateral ureter stents    TONSILLECTOMY      TONSILLECTOMY             CURRENT MEDICATIONS:    dimenhyDRINATE (DRAMAMINE) 50 MG tablet  dimenhyDRINATE (DRAMAMINE) 50 MG tablet  oxyCODONE (ROXICODONE) 5 MG tablet  tamsulosin (FLOMAX) 0.4 MG capsule  Acetaminophen (TYLENOL PO)  ALBUTEROL IN  Fluticasone Propionate, Inhal, (FLOVENT IN)  ibuprofen (ADVIL,MOTRIN) 200 MG tablet  LORAZEPAM PO  MedroxyPROGESTERone Acetate (DEPO-PROVERA IM)  polyethylene glycol (MIRALAX) 17 GM/Dose powder  SERTRALINE HCL  "PO  tamsulosin (FLOMAX) 0.4 MG capsule         ALLERGIES:  Allergies   Allergen Reactions    Alprazolam Other (See Comments)     Like a zombie   Felt like a Zombi  Like a zombie   Felt like a Zombi      Compazine [Prochlorperazine] Other (See Comments)     Dystonic reaction    Morphine Itching       FAMILY HISTORY:  Family History   Problem Relation Age of Onset    Urolithiasis Father         recurrent    Urolithiasis Paternal Aunt         recurrent    Urolithiasis Paternal Grandmother         recurrent       SOCIAL HISTORY:   Social History     Socioeconomic History    Marital status: Single   Tobacco Use    Smoking status: Every Day     Current packs/day: 1.00     Types: Cigarettes   Substance and Sexual Activity    Alcohol use: No    Drug use: No   Social History Narrative    Lives with family.       VITALS:  BP (!) 156/79   Pulse 71   Temp 98.4  F (36.9  C) (Oral)   Resp 16   Ht 1.6 m (5' 3\")   Wt 113.4 kg (250 lb)   SpO2 99%   BMI 44.29 kg/m      PHYSICAL EXAM    Physical Exam  Vitals and nursing note reviewed.   Constitutional:       General: She is not in acute distress.     Appearance: Normal appearance. She is normal weight. She is not toxic-appearing or diaphoretic.   HENT:      Right Ear: External ear normal.      Left Ear: External ear normal.   Eyes:      Conjunctiva/sclera: Conjunctivae normal.   Cardiovascular:      Rate and Rhythm: Normal rate and regular rhythm.   Pulmonary:      Effort: Pulmonary effort is normal. No respiratory distress.   Abdominal:      Tenderness: There is no abdominal tenderness. There is left CVA tenderness. There is no right CVA tenderness, guarding or rebound.   Musculoskeletal:      Right lower leg: No edema.      Left lower leg: No edema.   Skin:     Findings: No erythema or rash.   Neurological:      Mental Status: She is alert. Mental status is at baseline.             LAB:  All pertinent labs reviewed and interpreted.  Labs Ordered and Resulted from Time of ED " Arrival to Time of ED Departure   ROUTINE UA WITH MICROSCOPIC REFLEX TO CULTURE - Abnormal       Result Value    Color Urine Yellow      Appearance Urine Clear      Glucose Urine Negative      Bilirubin Urine Negative      Ketones Urine Negative      Specific Gravity Urine 1.025      Blood Urine 0.1 mg/dL (*)     pH Urine 6.5      Protein Albumin Urine 20 (*)     Urobilinogen Urine <2.0      Nitrite Urine Negative      Leukocyte Esterase Urine Negative      Mucus Urine Present (*)     RBC Urine 38 (*)     WBC Urine 1      Squamous Epithelials Urine 11 (*)    HCG QUALITATIVE URINE - Normal    hCG Urine Qualitative Negative         RADIOLOGY:  Reviewed all pertinent imaging. Please see official radiology report.  Abd/pelvis CT no contrast - Stone Protocol   Final Result   IMPRESSION:    1.  6.5 mm stone in the proximal left ureter with mild left-sided hydronephrosis. There are a few additional small stones in the left kidney ranging in size from 1 to 2 mm. A few tiny punctate nonobstructing stones in the right kidney.           PROCEDURES:   None.     Fady Perez PA-C  Emergency Medicine  [facility] EMERGENCY ROOM  [address] [id number] [phone]  Dept: [phone]       Fady Perez PA-C  07/19/24 9514

## 2024-07-19 NOTE — ED NOTES
Patient received her test results through [facility] and notes several kidney stones. She is requesting to discharge with prescriptions now and declined the Toradol injection. Provider notified.

## 2024-07-19 NOTE — Clinical Note
Isha Valderrama was seen and treated in our emergency department on 7/19/2024.  She may return to work on 07/22/2024.       If you have any questions or concerns, please don't hesitate to call.      Fady Perez PA-C

## 2024-07-20 ENCOUNTER — NURSE TRIAGE (OUTPATIENT)
Dept: NURSING | Facility: CLINIC | Age: 49
End: 2024-07-20
Payer: COMMERCIAL

## 2024-07-21 ENCOUNTER — NURSE TRIAGE (OUTPATIENT)
Dept: NURSING | Facility: CLINIC | Age: 49
End: 2024-07-21
Payer: COMMERCIAL

## 2024-07-21 DIAGNOSIS — N20.1 LEFT URETERAL STONE: Primary | ICD-10-CM

## 2024-07-21 RX ORDER — OXYCODONE HYDROCHLORIDE 5 MG/1
5 TABLET ORAL EVERY 4 HOURS PRN
Qty: 12 TABLET | Refills: 0 | Status: SHIPPED | OUTPATIENT
Start: 2024-07-21 | End: 2024-07-24

## 2024-07-21 NOTE — TELEPHONE ENCOUNTER
Pt looking to get oxycodone refill, has 3 tabs left.  Seen at ED yesterday for nephrolithiasis.  Pt states she is followed by [facility], left a message on Friday and aware to page on-call for refills.    Pt then said she has 5 tabs left. FNA advised to call [facility] answering service [phone] to page directly after 8 AM.    Pt verbalized understanding.    Lizet Davis RN/Elizabeth Nurse Advisor          Reason for Disposition    Caller requesting a CONTROLLED substance prescription refill (e.g., narcotics, ADHD medicines)    Protocols used: Medication Refill and Renewal Call-A-

## 2024-07-21 NOTE — TELEPHONE ENCOUNTER
Nurse Triage SBAR    Is this a 2nd Level Triage? No - on call Provider paged to discuss patient's request    Situation/Background: Patient is calling with concern of kidney stones, has Rx for percocet. Patient states that she was advised in ED on 7/19/24 that Rx can be reordered on the weekend. Patient is not established, has first appointment with [facility] tomorrow. Patient is taking tylenol, ibuprofen, along with oxycodone. Patient has one tablet left. Patient declines triage of symptoms.     This writer cannot locate any notes indicating that the on -call provider should be paged for a refill.     Assessment:   Back pain level is a 6 of 10 with oxycodone 5 mg every 4 hours prn.   [facility] [address] is preferred pharmacy.     Recommendation: Per disposition, Call when office is open. Patient advised that this writer is unable to locate notes in EMR. Patient declines triage of symptoms. Patient is not currently established. If patient has pain that is not managed, will need to go to ED for evaluation. Reviewed Care Advice with patient and verbalizes understanding. Declines additional questions. Advised patient to call back with any new or worsening symptoms. Patient verbalized understanding and agrees with plan.     This writer paged the on-call Provider to discuss the patient call and request for pain medication.     Protocol Recommended Disposition: Telephone advice    Patient is not currently established with [facility]. Patient has visit scheduled with Kourtney Guzman on 7/22/24, located at [facility]  Paged On-Call Provider at: 3:58 PM    Provider, Dr. Rc Marquis, returning page to Nurse Advisors at 4:05 PM    Provider Recommendations/Plan:  Provider know patient from previous urology clinic, will send Rx.  Patient needs to follow up with visit tomorrow, Provider will not send additional Rx's.       Provider Recommendation Follow Up:   Reached patient/caregiver. Informed of provider's  recommendations. Patient verbalized understanding and agrees with the plan.         Kailee Steward RN on 7/21/2024 at 3:20 PM  [facility] Nurse Advisors  Reason for Disposition   Caller requesting a CONTROLLED substance prescription refill (e.g., narcotics, ADHD medicines)    Protocols used: Medication Refill and Renewal Call-A-

## 2024-07-22 ENCOUNTER — VIRTUAL VISIT (OUTPATIENT)
Dept: UROLOGY | Facility: CLINIC | Age: 49
End: 2024-07-22
Payer: COMMERCIAL

## 2024-07-22 ENCOUNTER — TELEPHONE (OUTPATIENT)
Dept: UROLOGY | Facility: CLINIC | Age: 49
End: 2024-07-22

## 2024-07-22 DIAGNOSIS — N20.0 LEFT NEPHROLITHIASIS: ICD-10-CM

## 2024-07-22 DIAGNOSIS — R10.9 LEFT FLANK PAIN: ICD-10-CM

## 2024-07-22 PROCEDURE — 99214 OFFICE O/P EST MOD 30 MIN: CPT | Mod: GT | Performed by: UROLOGY

## 2024-07-22 ASSESSMENT — PAIN SCALES - GENERAL: PAINLEVEL: SEVERE PAIN (7)

## 2024-07-22 NOTE — PROGRESS NOTES
Virtual Visit Details    Type of service:  Video Visit   Video Start Time: 1:00 PM  Video End Time:1:05 PM    Originating Location (pt. Location): Home    Distant Location (provider location):  On-site  Platform used for Video Visit: Erich    Name: Isha Valderrama   MRN: [id number]  YOB: 1975    Assessment & Plan             Assessment and Plan:  48 year old female with a 6 mm left proximal ureteral stone and renal colic.     1. Left flank pain  2. Left ureterolithiasis    We discussed observation, shock wave lithotripsy, ureteroscopy as the main surgical approaches to upper urinary tract stones.  We reviewed risks and benefits including but not limited to the following: bleeding, infection, damage to adjacent organs, ureteral stricture, need for staged treatment, incomplete stone removal.    Ultimately the patient has elected to proceed with ureteroscopy because of ureteroscopy    We discussed the benefits and risks of left ureteroscopy, including but not limited to, bleeding, infection, need for stent/stent related symptoms, injury to ureter, need for second procedure if unable to reach stone or residual fragments.       Plan:  60 min left ureteroscopy      Rc Marquis MD  July 22, 2024         Chief Complaint: Left flank pain and stones        History of Present Illness:  Isha Valderrama is a 48 year old female seen in consultation from Fady Perez for discussion of left ureteral stone.      The current episode was first found due to severe left flank pain that led to ED presentation on 7/19/2024.  CT on 7/19/2024 (images reviewed) demonstrated a 6 mm left proximal ureteral stone and small bilateral renal stones.    Not eating as much.  Pushing fluids.        Currently, they are experiencing significant flank pain, no nausea, no vomiting, no hematuria, or no dysuria.  They have not experienced recent stone passage.     Pertinent stone history:  + Personal stone history  + Prior  stone procedure -> URS in 2014  + Prior stone analysis  -- Prior metabolic evaluation      Pertinent stone medical history  + Obesity   -- Diabetes  -- Neurologic disease limiting mobility   -- Osteoporosis  -- Gout  -- Gastric bypass surgery  -- Sarcoidosis  -- Inflammatory bowel disease  -- History of non-stone  surgery     Pertinent medications:  -- Antiplatelet  -- Anticoagulant  -- Topiramate   -- Thiazide  -- Potassium supplement      I reviewed internal labs, of which pertinent ones include:   Hemoglobin   Date Value Ref Range Status   10/02/2023 13.6 11.7 - 15.7 g/dL Final   03/04/2018 15.4 11.7 - 15.7 g/dL Final     Potassium   Date Value Ref Range Status   10/02/2023 4.2 3.4 - 5.3 mmol/L Final   06/14/2023 4.3 3.5 - 5.0 mmol/L Final   03/04/2018 3.6 3.4 - 5.3 mmol/L Final     Creatinine   Date Value Ref Range Status   10/02/2023 0.59 0.51 - 0.95 mg/dL Final   03/04/2018 0.64 0.52 - 1.04 mg/dL Final     pH Urine   Date Value Ref Range Status   07/19/2024 6.5 5.0 - 7.0 Final   03/04/2018 5.0 5.0 - 7.0 pH Final       Calcium   Date Value Ref Range Status   10/02/2023 8.6 8.6 - 10.0 mg/dL Final   03/04/2018 8.9 8.5 - 10.1 mg/dL Final         I reviewed internal records which in summarized above.         Past Medical History:  Past Medical History:   Diagnosis Date    Anxiety     Kidney stone     Uncomplicated asthma             Past Surgical History:  Past Surgical History:   Procedure Laterality Date    KIDNEY STONE SURGERY      Kidney Stone [place] (Dr. Ku)    KNEE SURGERY      ORTHOPEDIC SURGERY      OTHER SURGICAL HISTORY      bilateral ureter stents    TONSILLECTOMY      TONSILLECTOMY              Social History:  Social History     Tobacco Use    Smoking status: Every Day     Current packs/day: 1.00     Types: Cigarettes   Substance Use Topics    Alcohol use: No    Drug use: No            Family History:  Family History   Problem Relation Age of Onset    Urolithiasis Father         recurrent     Urolithiasis Paternal Aunt         recurrent    Urolithiasis Paternal Grandmother         recurrent            Allergies:  Allergies   Allergen Reactions    Alprazolam Other (See Comments)     Like a zombie   Felt like a Zombi  Like a zombie   Felt like a Zombi      Compazine [Prochlorperazine] Other (See Comments)     Dystonic reaction    Morphine Itching            Medications:  Current Outpatient Medications   Medication Sig Dispense Refill    Acetaminophen (TYLENOL PO) Take 1,000 mg by mouth every 6 hours as needed      ALBUTEROL IN       dimenhyDRINATE (DRAMAMINE) 50 MG tablet Take 1 tablet (50 mg) by mouth at bedtime for 7 days 7 tablet 0    dimenhyDRINATE (DRAMAMINE) 50 MG tablet Take 1 tablet (50 mg) by mouth every 6 hours as needed for other (kidney stone pain management) 28 tablet 0    Fluticasone Propionate, Inhal, (FLOVENT IN)       ibuprofen (ADVIL,MOTRIN) 200 MG tablet Take 3 tablets (600 mg) by mouth every 8 hours as needed for mild pain (Patient taking differently: Take 800 mg by mouth every 6 hours as needed for mild pain) 60 tablet 0    LORAZEPAM PO       MedroxyPROGESTERone Acetate (DEPO-PROVERA IM)       oxyCODONE (ROXICODONE) 5 MG tablet Take 1 tablet (5 mg) by mouth every 4 hours as needed for severe pain If pain is not improved with acetaminophen and ibuprofen. 12 tablet 0    polyethylene glycol (MIRALAX) 17 GM/Dose powder Take 17 g (1 Capful) by mouth daily 238 g 0    SERTRALINE HCL PO Take by mouth daily      tamsulosin (FLOMAX) 0.4 MG capsule Take 1 capsule (0.4 mg) by mouth daily for 10 doses 10 capsule 0    tamsulosin (FLOMAX) 0.4 MG capsule Take 1 capsule (0.4 mg) by mouth daily 30 capsule 1     No current facility-administered medications for this visit.       Physical Exam            Physical Exam:  B/P: Data Unavailable, T: Data Unavailable, P: Data Unavailable, R: Data Unavailable  Estimated body mass index is 44.29 kg/m  as calculated from the following:    Height as of 7/19/24: 1.6  "sofia (5' 3\").    Weight as of 7/19/24: 113.4 kg (250 lb).  General Appearance Adult: Alert, no acute distress, oriented    Outside records:   I spent 0 minutes reviewing outside records.    "

## 2024-07-22 NOTE — TELEPHONE ENCOUNTER
Spoke with: Patient      Date of surgery: Monday July 29 2024 with Dr Marquez      Location: [facility]      Informed patient they will need a adult : YES      Pre op with provider:  Dr Marquis will do DOS - Recent ED note      H&P Scheduled in PAC- NA        Pre procedure covid : Not req      Additional imaging: NA        Surgery Packet : Sent via [facility]       Additional comments: Please call for surgery teaching

## 2024-07-22 NOTE — NURSING NOTE
Is the patient currently in the state of [place]? YES    Visit mode:VIDEO    If the visit is dropped, the patient can be reconnected by: VIDEO VISIT: Text to cell phone:   Telephone Information:   Mobile [phone]       Will anyone else be joining the visit? NO  (If patient encounters technical issues they should call [phone] :[phone])    How would you like to obtain your AVS? MyChart    Are changes needed to the allergy or medication list? No    Are refills needed on medications prescribed by this physician? NO    Reason for visit: Follow Up    Tess PALACIO

## 2024-07-23 ENCOUNTER — TELEPHONE (OUTPATIENT)
Dept: UROLOGY | Facility: CLINIC | Age: 49
End: 2024-07-23
Payer: COMMERCIAL

## 2024-07-23 NOTE — TELEPHONE ENCOUNTER
Preop not needed, as recent ED visit and provider will update day of surgery.  Last ua on 7/19 is clear. Aby Riley RN

## 2024-07-24 ENCOUNTER — TELEPHONE (OUTPATIENT)
Dept: UROLOGY | Facility: CLINIC | Age: 49
End: 2024-07-24
Payer: COMMERCIAL

## 2024-07-24 DIAGNOSIS — N20.1 LEFT URETERAL STONE: ICD-10-CM

## 2024-07-24 RX ORDER — OXYCODONE HYDROCHLORIDE 5 MG/1
5 TABLET ORAL EVERY 4 HOURS PRN
Qty: 12 TABLET | Refills: 0 | Status: SHIPPED | OUTPATIENT
Start: 2024-07-24 | End: 2024-07-26

## 2024-07-24 NOTE — TELEPHONE ENCOUNTER
Patient requesting a refill of pain medication.  Last fill 7/21 for 12, patient has one tablet left.  Upcoming surgery on 7/29, please advise.  Patient is taking other protocol medications.  Aby Riley RN

## 2024-07-24 NOTE — TELEPHONE ENCOUNTER
[facility] Call Center    Phone Message    May a detailed message be left on voicemail: yes     Reason for Call: Medication Refill Request    Has the patient contacted the pharmacy for the refill? Yes   Name of medication being requested: oxyCODONE (ROXICODONE) 5 MG tablet   Provider who prescribed the medication: hira  Pharmacy: [id number] - [place], [place] - [address] [place] [address]   Date medication is needed: ASAP- pt only has 1 left and has surgery in 5 days- sending HP due to pain- please call pt once it is sent over        Action Taken: Message routed to:  Other: uro    Travel Screening: Not Applicable     Date of Service:

## 2024-07-25 RX ORDER — DULOXETIN HYDROCHLORIDE 60 MG/1
60 CAPSULE, DELAYED RELEASE ORAL DAILY
COMMUNITY

## 2024-07-25 RX ORDER — DULOXETIN HYDROCHLORIDE 30 MG/1
CAPSULE, DELAYED RELEASE ORAL
COMMUNITY
Start: 2024-06-16

## 2024-07-26 ENCOUNTER — ANESTHESIA EVENT (OUTPATIENT)
Dept: SURGERY | Facility: AMBULATORY SURGERY CENTER | Age: 49
End: 2024-07-26
Payer: COMMERCIAL

## 2024-07-26 ENCOUNTER — HOSPITAL ENCOUNTER (EMERGENCY)
Facility: CLINIC | Age: 49
Discharge: HOME | End: 2024-07-26
Attending: EMERGENCY MEDICINE | Admitting: EMERGENCY MEDICINE
Payer: COMMERCIAL

## 2024-07-26 ENCOUNTER — MYC REFILL (OUTPATIENT)
Dept: UROLOGY | Facility: CLINIC | Age: 49
End: 2024-07-26
Payer: COMMERCIAL

## 2024-07-26 VITALS
TEMPERATURE: 97.4 F | DIASTOLIC BLOOD PRESSURE: 98 MMHG | SYSTOLIC BLOOD PRESSURE: 161 MMHG | RESPIRATION RATE: 19 BRPM | OXYGEN SATURATION: 98 % | HEART RATE: 85 BPM

## 2024-07-26 DIAGNOSIS — Z76.0 ENCOUNTER FOR MEDICATION REFILL: ICD-10-CM

## 2024-07-26 DIAGNOSIS — N20.1 LEFT URETERAL STONE: ICD-10-CM

## 2024-07-26 DIAGNOSIS — N20.1 URETEROLITHIASIS: ICD-10-CM

## 2024-07-26 PROCEDURE — 99283 EMERGENCY DEPT VISIT LOW MDM: CPT

## 2024-07-26 RX ORDER — OXYCODONE HYDROCHLORIDE 5 MG/1
5 TABLET ORAL EVERY 4 HOURS PRN
Qty: 12 TABLET | Refills: 0 | Status: SHIPPED | OUTPATIENT
Start: 2024-07-26 | End: 2024-07-26

## 2024-07-26 RX ORDER — OXYCODONE HYDROCHLORIDE 5 MG/1
5 TABLET ORAL EVERY 6 HOURS PRN
Qty: 12 TABLET | Refills: 0 | Status: SHIPPED | OUTPATIENT
Start: 2024-07-26 | End: 2024-07-29

## 2024-07-26 ASSESSMENT — COLUMBIA-SUICIDE SEVERITY RATING SCALE - C-SSRS
6. HAVE YOU EVER DONE ANYTHING, STARTED TO DO ANYTHING, OR PREPARED TO DO ANYTHING TO END YOUR LIFE?: NO
1. IN THE PAST MONTH, HAVE YOU WISHED YOU WERE DEAD OR WISHED YOU COULD GO TO SLEEP AND NOT WAKE UP?: NO
2. HAVE YOU ACTUALLY HAD ANY THOUGHTS OF KILLING YOURSELF IN THE PAST MONTH?: NO

## 2024-07-26 NOTE — ED NOTES
Patient verbalized understanding of discharge instructions including medication administration and recommended follow up care as noted on discharge instructions.  Written discharge instructions given, denies any further questions.  Prescriptions: were electronically sent, patient is aware.  Barriers to learning identified and addressed:  None observed. Discharged from the waiting room, see providers notes for further assessment.

## 2024-07-26 NOTE — TELEPHONE ENCOUNTER
Pt called again this morning needing the same refill. She is all out of the last 12 pills. She needs enough to get to Surgery appt. Please send another refill today. Thank you!

## 2024-07-26 NOTE — ED PROVIDER NOTES
EMERGENCY DEPARTMENT ENCOUNTER      NAME: Isha Valderrama  AGE: 48 year old female  YOB: 1975  MRN: [id number]  EVALUATION DATE & TIME: No admission date for patient encounter.    PCP: Dewayne Barrera    ED PROVIDER: Rickie Laurent M.D.      Chief Complaint   Patient presents with    Medication Refill         FINAL IMPRESSION:  1. Ureterolithiasis    2. Encounter for medication refill          ED COURSE & MEDICAL DECISION MAKIN:29 PM I met with the patient to gather history and to perform my initial exam. We discussed plans for the ED course, including diagnostic testing and treatment.    5:34 PM I discussed plans for discharge with the patient, which they were agreeable to. We discussed supportive cares at home and reasons for return to the ER including new or worsening symptoms. All questions and concerns were addressed. Patient to be discharged by RN.     48 year old female presents to the Emergency Department for evaluation of kidney stone pain and medication refill.  Patient has recently been diagnosed with a 6 mm left proximal ureteral stone on .  She has planned for urologic intervention in a few days.  She presents with breakthrough pain, has been consistently taking oxycodone every 4 hours for pain management in addition to scheduling Tylenol ibuprofen and Dramamine and has subsequently run out of oxycodone.  She does not have any other complaints like fever, severe increase in pain, intractable vomiting, etc.  She is principally concerned about getting medication refilled.  I am bit concerned about the rate at which she is utilizing oxycodone considering that she has already used more than 24 tablets in the last week taking this consistently.  She certainly has a acute painful process but we discussed that she will need to try to space her narcotic use out a bit more as even once she has had a procedure I would expect some persistent pain and the current rate of opiate  utilization does not seem sustainable.  Limited oxycodone refill was provided with the above precautions.  She will follow-up as planned for procedure next week.      Medical Decision Making  Obtained supplemental history:Supplemental history obtained?: No  Reviewed external records: External records reviewed?: Outpatient Record: [facility] Emergency Room visit on 07/19/24  Care impacted by chronic illness:Chronic Lung Disease and Mental Health  Care significantly affected by social determinants of health:N/A  Did you consider but not order tests?: Work up considered but not performed and documented in chart, if applicable  Did you interpret images independently?: Independent interpretation of ECG and images noted in documentation, when applicable.  Consultation discussion with other provider:Did you involve another provider (consultant, , pharmacy, etc.)?: No  Discharge. I prescribed additional prescription strength medication(s) as charted. See documentation for any additional details.        MEDICATIONS GIVEN IN THE EMERGENCY:  Medications - No data to display    NEW PRESCRIPTIONS STARTED AT TODAY'S ER VISIT  Discharge Medication List as of 7/26/2024  6:10 PM             =================================================================    HPI    Patient information was obtained from: Patient     Use of : N/A       Isha Valderrama is a 48 year old female with a pertinent medical history of nephrolithiasis, ureterolithiasis, obesity, anxiety, who presents to this ED by walk-in for a  medication refill.    Per Chart Review, patient was seen at the [facility] Emergency Room on 07/19/24 for evaluation of left flank pain. UA did not appear consistent with infection. Abdomen/Pelvis CT notable for 6.5 mm stone in the proximal left ureter with mild left-sided hydronephrosis, a few additional small stones in the left kidney ranging in size from 1  to 2 mm, and a few tiny punctate nonobstructing stones in the right kidney. [facility] was consulted who recommended outpatient follow up. Patient was discharged with prescriptions of Dramamine, Oxycodone, and Flomax.     Patient endorses the history above, and mentions that she is scheduled to undergo surgery for removal of a kidney stone in approximately 3 days. She endorses being prescribed 12 tablets of Oxycodone on 07/19/24 for her kidney stone associated pains, but she mentions that states that she was prompted to visit the ED today for a refill of the Oxycodone as she ran out of the medication today. She also endorses regularly taking Ibuprofen, Tylenol, Dramamine, and Flomax since her ED visit on 07/19/24, but she notes that prior to 07/19/24 she did not take any pain medications regularly/daily. She denies any other complications at this time.       REVIEW OF SYSTEMS   All systems reviewed and negative except as noted in HPI.    PAST MEDICAL HISTORY:  Past Medical History:   Diagnosis Date    Anxiety     Gastroesophageal reflux disease     Kidney stone     Motion sickness     Obese     Uncomplicated asthma        PAST SURGICAL HISTORY:  Past Surgical History:   Procedure Laterality Date    KIDNEY STONE SURGERY      Kidney Stone [place] (Dr. Ku)    KNEE SURGERY      ORTHOPEDIC SURGERY      OTHER SURGICAL HISTORY      bilateral ureter stents    TONSILLECTOMY      TONSILLECTOMY             CURRENT MEDICATIONS:    No current facility-administered medications for this encounter.     Current Outpatient Medications   Medication Sig Dispense Refill    oxyCODONE (ROXICODONE) 5 MG tablet Take 1 tablet (5 mg) by mouth every 6 hours as needed 12 tablet 0    Acetaminophen (TYLENOL PO) Take 1,000 mg by mouth every 6 hours as needed      ALBUTEROL IN       DULoxetine (CYMBALTA) 30 MG capsule Take 1 Capsule (30 mg) by mouth daily. Take with 60mg capsule to equal 90mg*      DULoxetine (CYMBALTA) 60 MG capsule Take 1 Capsule  (60 mg) by mouth daily.*      Fluticasone Propionate, Inhal, (FLOVENT IN)       ibuprofen (ADVIL,MOTRIN) 200 MG tablet Take 3 tablets (600 mg) by mouth every 8 hours as needed for mild pain (Patient taking differently: Take 800 mg by mouth every 6 hours as needed for mild pain) 60 tablet 0    LORAZEPAM PO       MedroxyPROGESTERone Acetate (DEPO-PROVERA IM)       polyethylene glycol (MIRALAX) 17 GM/Dose powder Take 17 g (1 Capful) by mouth daily 238 g 0    SERTRALINE HCL PO Take by mouth daily      tamsulosin (FLOMAX) 0.4 MG capsule Take 1 capsule (0.4 mg) by mouth daily for 10 doses 10 capsule 0    tamsulosin (FLOMAX) 0.4 MG capsule Take 1 capsule (0.4 mg) by mouth daily 30 capsule 1         ALLERGIES:  Allergies   Allergen Reactions    Alprazolam Other (See Comments)     Like a zombie   Felt like a Zombi  Like a zombie   Felt like a Zombi      Compazine [Prochlorperazine] Other (See Comments)     Dystonic reaction    Morphine Itching       FAMILY HISTORY:  Family History   Problem Relation Age of Onset    Urolithiasis Father         recurrent    Urolithiasis Paternal Aunt         recurrent    Urolithiasis Paternal Grandmother         recurrent       SOCIAL HISTORY:   Social History     Socioeconomic History    Marital status: Single   Tobacco Use    Smoking status: Every Day     Current packs/day: 1.00     Types: Cigarettes   Substance and Sexual Activity    Alcohol use: No    Drug use: No   Social History Narrative    Lives with family.       VITALS:  BP (!) 161/98   Pulse 85   Temp 97.4  F (36.3  C)   Resp 19   SpO2 98%     PHYSICAL EXAM    Constitutional: Well developed, Well nourished, NAD.  HENT: Normocephalic, Atraumatic. Neck Supple.  Eyes: EOMI, Conjunctiva normal.  Respiratory: Breathing comfortably on room air. Speaks full sentences easily. Lungs clear to ascultation.  Cardiovascular: Normal heart rate, Regular rhythm. No peripheral edema.  Abdomen: Soft, nontender  Musculoskeletal: Good range of  motion in all major joints. No major deformities noted.  Integument: Warm, Dry.  Neurologic: Alert & awake, Normal motor function, Normal sensory function, No focal deficits noted.   Psychiatric: Cooperative. Affect appropriate.         I, Hans Barragan, am serving as a scribe to document services personally performed by Dr. Rickie Laurent, based on my observation and the provider's statements to me. I, Rickie Laurent MD attest that Hans Barragan is acting in a scribe capacity, has observed my performance of the services and has documented them in accordance with my direction.    Rickie Laurent M.D.  Emergency Medicine  [facility] EMERGENCY ROOM  [facility] [facility] [address]  [phone]  Dept: [phone]       Rickie Laurent MD  07/27/24 0918

## 2024-07-26 NOTE — ED TRIAGE NOTES
Pt here for a refill of there OxyContin for kidney stones. Has surgery scheduled for Monday for stone removal but has went through all of her pain medication and will not be able to get through the weekend. Taking it every 4 hours. Also taking flomax, tylenol and ibuprofen. Last took the oxy at 1715 today.      Triage Assessment (Adult)       Row Name 07/26/24 3823          Triage Assessment    Airway WDL WDL        Respiratory WDL    Respiratory WDL WDL        Skin Circulation/Temperature WDL    Skin Circulation/Temperature WDL WDL        Cardiac WDL    Cardiac WDL WDL        Peripheral/Neurovascular WDL    Peripheral Neurovascular WDL WDL        Cognitive/Neuro/Behavioral WDL    Cognitive/Neuro/Behavioral WDL WDL

## 2024-07-26 NOTE — DISCHARGE INSTRUCTIONS
You were seen in the emergency department for ongoing flank pain related to recently diagnosed kidney stone.  We are going to refill a limited amount of oxycodone for continued pain management but we discussed we would really like to space this out further and have utilize it every 6 hours at the most for breakthrough severe pain and try to utilize your other medications first is much as possible as we would expect some continued pain even after your upcoming urologic procedure.

## 2024-07-29 ENCOUNTER — TELEPHONE (OUTPATIENT)
Dept: UROLOGY | Facility: CLINIC | Age: 49
End: 2024-07-29

## 2024-07-29 ENCOUNTER — ANESTHESIA (OUTPATIENT)
Dept: SURGERY | Facility: AMBULATORY SURGERY CENTER | Age: 49
End: 2024-07-29
Payer: COMMERCIAL

## 2024-07-29 ENCOUNTER — HOSPITAL ENCOUNTER (OUTPATIENT)
Facility: AMBULATORY SURGERY CENTER | Age: 49
Discharge: HOME | End: 2024-07-29
Attending: UROLOGY
Payer: COMMERCIAL

## 2024-07-29 VITALS
OXYGEN SATURATION: 96 % | RESPIRATION RATE: 17 BRPM | HEIGHT: 63 IN | DIASTOLIC BLOOD PRESSURE: 69 MMHG | WEIGHT: 235 LBS | HEART RATE: 69 BPM | TEMPERATURE: 97.1 F | SYSTOLIC BLOOD PRESSURE: 149 MMHG | BODY MASS INDEX: 41.64 KG/M2

## 2024-07-29 DIAGNOSIS — N20.0 LEFT NEPHROLITHIASIS: ICD-10-CM

## 2024-07-29 DIAGNOSIS — R10.9 LEFT FLANK PAIN: ICD-10-CM

## 2024-07-29 LAB
HCG UR QL: NEGATIVE
INTERNAL QC OK POCT: NORMAL
POCT KIT EXPIRATION DATE: NORMAL
POCT KIT LOT NUMBER: NORMAL

## 2024-07-29 PROCEDURE — 82365 CALCULUS SPECTROSCOPY: CPT | Mod: 90 | Performed by: INTERNAL MEDICINE

## 2024-07-29 PROCEDURE — 74420 UROGRAPHY RTRGR +-KUB: CPT | Mod: 26 | Performed by: UROLOGY

## 2024-07-29 PROCEDURE — 52356 CYSTO/URETERO W/LITHOTRIPSY: CPT | Mod: LT | Performed by: UROLOGY

## 2024-07-29 PROCEDURE — 99000 SPECIMEN HANDLING OFFICE-LAB: CPT | Performed by: INTERNAL MEDICINE

## 2024-07-29 DEVICE — IMPLANTABLE DEVICE: Type: IMPLANTABLE DEVICE | Site: URETER | Status: FUNCTIONAL

## 2024-07-29 RX ORDER — FENTANYL CITRATE 0.05 MG/ML
25 INJECTION, SOLUTION INTRAMUSCULAR; INTRAVENOUS
Status: DISCONTINUED | OUTPATIENT
Start: 2024-07-29 | End: 2024-07-30 | Stop reason: HOSPADM

## 2024-07-29 RX ORDER — GLYCOPYRROLATE 0.2 MG/ML
INJECTION, SOLUTION INTRAMUSCULAR; INTRAVENOUS PRN
Status: DISCONTINUED | OUTPATIENT
Start: 2024-07-29 | End: 2024-07-29

## 2024-07-29 RX ORDER — ONDANSETRON 2 MG/ML
4 INJECTION INTRAMUSCULAR; INTRAVENOUS EVERY 30 MIN PRN
Status: DISCONTINUED | OUTPATIENT
Start: 2024-07-29 | End: 2024-07-30 | Stop reason: HOSPADM

## 2024-07-29 RX ORDER — DEXAMETHASONE SODIUM PHOSPHATE 4 MG/ML
INJECTION, SOLUTION INTRA-ARTICULAR; INTRALESIONAL; INTRAMUSCULAR; INTRAVENOUS; SOFT TISSUE PRN
Status: DISCONTINUED | OUTPATIENT
Start: 2024-07-29 | End: 2024-07-29

## 2024-07-29 RX ORDER — CEPHALEXIN 500 MG/1
500 CAPSULE ORAL 2 TIMES DAILY
Qty: 14 CAPSULE | Refills: 0 | Status: SHIPPED | OUTPATIENT
Start: 2024-07-29 | End: 2024-08-05

## 2024-07-29 RX ORDER — ONDANSETRON 2 MG/ML
INJECTION INTRAMUSCULAR; INTRAVENOUS PRN
Status: DISCONTINUED | OUTPATIENT
Start: 2024-07-29 | End: 2024-07-29

## 2024-07-29 RX ORDER — SENNA AND DOCUSATE SODIUM 50; 8.6 MG/1; MG/1
1 TABLET, FILM COATED ORAL
COMMUNITY
Start: 2024-07-29 | End: 2024-08-01

## 2024-07-29 RX ORDER — NALOXONE HYDROCHLORIDE 0.4 MG/ML
0.1 INJECTION, SOLUTION INTRAMUSCULAR; INTRAVENOUS; SUBCUTANEOUS
Status: DISCONTINUED | OUTPATIENT
Start: 2024-07-29 | End: 2024-07-30 | Stop reason: HOSPADM

## 2024-07-29 RX ORDER — KETOROLAC TROMETHAMINE 15 MG/ML
INJECTION, SOLUTION INTRAMUSCULAR; INTRAVENOUS PRN
Status: DISCONTINUED | OUTPATIENT
Start: 2024-07-29 | End: 2024-07-29

## 2024-07-29 RX ORDER — PROPOFOL 10 MG/ML
INJECTION, EMULSION INTRAVENOUS PRN
Status: DISCONTINUED | OUTPATIENT
Start: 2024-07-29 | End: 2024-07-29

## 2024-07-29 RX ORDER — HYDROMORPHONE HCL IN WATER/PF 6 MG/30 ML
0.2 PATIENT CONTROLLED ANALGESIA SYRINGE INTRAVENOUS EVERY 5 MIN PRN
Status: DISCONTINUED | OUTPATIENT
Start: 2024-07-29 | End: 2024-07-30 | Stop reason: HOSPADM

## 2024-07-29 RX ORDER — MEPERIDINE HYDROCHLORIDE 25 MG/ML
12.5 INJECTION INTRAMUSCULAR; INTRAVENOUS; SUBCUTANEOUS EVERY 5 MIN PRN
Status: DISCONTINUED | OUTPATIENT
Start: 2024-07-29 | End: 2024-07-30 | Stop reason: HOSPADM

## 2024-07-29 RX ORDER — LIDOCAINE HYDROCHLORIDE 20 MG/ML
JELLY TOPICAL PRN
Status: DISCONTINUED | OUTPATIENT
Start: 2024-07-29 | End: 2024-07-29 | Stop reason: HOSPADM

## 2024-07-29 RX ORDER — ONDANSETRON 4 MG/1
4 TABLET, ORALLY DISINTEGRATING ORAL EVERY 30 MIN PRN
Status: DISCONTINUED | OUTPATIENT
Start: 2024-07-29 | End: 2024-07-30 | Stop reason: HOSPADM

## 2024-07-29 RX ORDER — HYDROMORPHONE HCL IN WATER/PF 6 MG/30 ML
0.4 PATIENT CONTROLLED ANALGESIA SYRINGE INTRAVENOUS EVERY 5 MIN PRN
Status: DISCONTINUED | OUTPATIENT
Start: 2024-07-29 | End: 2024-07-30 | Stop reason: HOSPADM

## 2024-07-29 RX ORDER — DEXAMETHASONE SODIUM PHOSPHATE 4 MG/ML
4 INJECTION, SOLUTION INTRA-ARTICULAR; INTRALESIONAL; INTRAMUSCULAR; INTRAVENOUS; SOFT TISSUE
Status: DISCONTINUED | OUTPATIENT
Start: 2024-07-29 | End: 2024-07-30 | Stop reason: HOSPADM

## 2024-07-29 RX ORDER — FENTANYL CITRATE 0.05 MG/ML
25 INJECTION, SOLUTION INTRAMUSCULAR; INTRAVENOUS EVERY 5 MIN PRN
Status: DISCONTINUED | OUTPATIENT
Start: 2024-07-29 | End: 2024-07-30 | Stop reason: HOSPADM

## 2024-07-29 RX ORDER — CEFAZOLIN SODIUM 2 G/100ML
2 INJECTION, SOLUTION INTRAVENOUS
Status: COMPLETED | OUTPATIENT
Start: 2024-07-29 | End: 2024-07-29

## 2024-07-29 RX ORDER — LIDOCAINE 40 MG/G
CREAM TOPICAL
Status: DISCONTINUED | OUTPATIENT
Start: 2024-07-29 | End: 2024-07-30 | Stop reason: HOSPADM

## 2024-07-29 RX ORDER — OXYCODONE HYDROCHLORIDE 5 MG/1
5 TABLET ORAL EVERY 6 HOURS PRN
Qty: 12 TABLET | Refills: 0 | Status: SHIPPED | OUTPATIENT
Start: 2024-07-29 | End: 2024-08-01

## 2024-07-29 RX ORDER — OXYCODONE HYDROCHLORIDE 5 MG/1
5 TABLET ORAL
Status: COMPLETED | OUTPATIENT
Start: 2024-07-29 | End: 2024-07-29

## 2024-07-29 RX ORDER — SODIUM CHLORIDE, SODIUM LACTATE, POTASSIUM CHLORIDE, CALCIUM CHLORIDE 600; 310; 30; 20 MG/100ML; MG/100ML; MG/100ML; MG/100ML
INJECTION, SOLUTION INTRAVENOUS CONTINUOUS
Status: DISCONTINUED | OUTPATIENT
Start: 2024-07-29 | End: 2024-07-30 | Stop reason: HOSPADM

## 2024-07-29 RX ORDER — CEFAZOLIN SODIUM 2 G/100ML
2 INJECTION, SOLUTION INTRAVENOUS SEE ADMIN INSTRUCTIONS
Status: DISCONTINUED | OUTPATIENT
Start: 2024-07-29 | End: 2024-07-30 | Stop reason: HOSPADM

## 2024-07-29 RX ORDER — PROPOFOL 10 MG/ML
INJECTION, EMULSION INTRAVENOUS CONTINUOUS PRN
Status: DISCONTINUED | OUTPATIENT
Start: 2024-07-29 | End: 2024-07-29

## 2024-07-29 RX ORDER — FENTANYL CITRATE 50 UG/ML
INJECTION, SOLUTION INTRAMUSCULAR; INTRAVENOUS PRN
Status: DISCONTINUED | OUTPATIENT
Start: 2024-07-29 | End: 2024-07-29

## 2024-07-29 RX ORDER — OXYCODONE HYDROCHLORIDE 10 MG/1
10 TABLET ORAL
Status: COMPLETED | OUTPATIENT
Start: 2024-07-29 | End: 2024-07-29

## 2024-07-29 RX ORDER — FENTANYL CITRATE 0.05 MG/ML
50 INJECTION, SOLUTION INTRAMUSCULAR; INTRAVENOUS EVERY 5 MIN PRN
Status: DISCONTINUED | OUTPATIENT
Start: 2024-07-29 | End: 2024-07-30 | Stop reason: HOSPADM

## 2024-07-29 RX ORDER — TOLTERODINE 2 MG/1
2 CAPSULE, EXTENDED RELEASE ORAL DAILY PRN
Qty: 7 CAPSULE | Refills: 0 | Status: SHIPPED | OUTPATIENT
Start: 2024-07-29

## 2024-07-29 RX ORDER — IBUPROFEN 200 MG
600 TABLET ORAL EVERY 6 HOURS PRN
Qty: 60 TABLET | Refills: 0 | Status: SHIPPED | OUTPATIENT
Start: 2024-07-29

## 2024-07-29 RX ORDER — ACETAMINOPHEN 325 MG/1
975 TABLET ORAL ONCE
Status: COMPLETED | OUTPATIENT
Start: 2024-07-29 | End: 2024-07-29

## 2024-07-29 RX ORDER — LIDOCAINE HYDROCHLORIDE 20 MG/ML
INJECTION, SOLUTION INFILTRATION; PERINEURAL PRN
Status: DISCONTINUED | OUTPATIENT
Start: 2024-07-29 | End: 2024-07-29

## 2024-07-29 RX ADMIN — ONDANSETRON 4 MG: 2 INJECTION INTRAMUSCULAR; INTRAVENOUS at 09:35

## 2024-07-29 RX ADMIN — PROPOFOL 200 MG: 10 INJECTION, EMULSION INTRAVENOUS at 09:24

## 2024-07-29 RX ADMIN — PROPOFOL 50 MG: 10 INJECTION, EMULSION INTRAVENOUS at 09:27

## 2024-07-29 RX ADMIN — CEFAZOLIN SODIUM 2 G: 2 INJECTION, SOLUTION INTRAVENOUS at 09:17

## 2024-07-29 RX ADMIN — KETOROLAC TROMETHAMINE 15 MG: 15 INJECTION, SOLUTION INTRAMUSCULAR; INTRAVENOUS at 09:48

## 2024-07-29 RX ADMIN — Medication 10 MG: at 09:35

## 2024-07-29 RX ADMIN — GLYCOPYRROLATE 0.2 MG: 0.2 INJECTION, SOLUTION INTRAMUSCULAR; INTRAVENOUS at 09:35

## 2024-07-29 RX ADMIN — SODIUM CHLORIDE, SODIUM LACTATE, POTASSIUM CHLORIDE, CALCIUM CHLORIDE: 600; 310; 30; 20 INJECTION, SOLUTION INTRAVENOUS at 08:29

## 2024-07-29 RX ADMIN — LIDOCAINE HYDROCHLORIDE 60 MG: 20 INJECTION, SOLUTION INFILTRATION; PERINEURAL at 09:24

## 2024-07-29 RX ADMIN — Medication 40 MG: at 09:25

## 2024-07-29 RX ADMIN — SODIUM CHLORIDE, SODIUM LACTATE, POTASSIUM CHLORIDE, CALCIUM CHLORIDE: 600; 310; 30; 20 INJECTION, SOLUTION INTRAVENOUS at 11:11

## 2024-07-29 RX ADMIN — PROPOFOL 200 MCG/KG/MIN: 10 INJECTION, EMULSION INTRAVENOUS at 09:28

## 2024-07-29 RX ADMIN — FENTANYL CITRATE 50 MCG: 50 INJECTION, SOLUTION INTRAMUSCULAR; INTRAVENOUS at 09:24

## 2024-07-29 RX ADMIN — OXYCODONE HYDROCHLORIDE 5 MG: 5 TABLET ORAL at 11:46

## 2024-07-29 RX ADMIN — FENTANYL CITRATE 50 MCG: 0.05 INJECTION, SOLUTION INTRAMUSCULAR; INTRAVENOUS at 11:11

## 2024-07-29 RX ADMIN — DEXAMETHASONE SODIUM PHOSPHATE 10 MG: 4 INJECTION, SOLUTION INTRA-ARTICULAR; INTRALESIONAL; INTRAMUSCULAR; INTRAVENOUS; SOFT TISSUE at 09:34

## 2024-07-29 RX ADMIN — FENTANYL CITRATE 50 MCG: 50 INJECTION, SOLUTION INTRAMUSCULAR; INTRAVENOUS at 09:22

## 2024-07-29 RX ADMIN — OXYCODONE HYDROCHLORIDE 5 MG: 10 TABLET ORAL at 11:58

## 2024-07-29 ASSESSMENT — LIFESTYLE VARIABLES: TOBACCO_USE: 1

## 2024-07-29 NOTE — ANESTHESIA CARE TRANSFER NOTE
Patient: Isha Valderrama    Procedure: Procedure(s):  Cystoscopy, Left Ureteroscopy, Left Retrograde Pyelogram, Left Laser Lithotripsy, Basket Stone Removal, Left Ureteral Stent Placement       Diagnosis: Left flank pain [R10.9]  Left nephrolithiasis [N20.0]  Diagnosis Additional Information: No value filed.    Anesthesia Type:   General     Note:    Oropharynx: oropharynx clear of all foreign objects and spontaneously breathing  Level of Consciousness: awake  Oxygen Supplementation: face mask  Level of Supplemental Oxygen (L/min / FiO2): 10  Independent Airway: airway patency satisfactory and stable  Dentition: dentition unchanged  Vital Signs Stable: post-procedure vital signs reviewed and stable  Report to RN Given: handoff report given  Patient transferred to: PACU    Handoff Report: Identifed the Patient, Identified the Reponsible Provider, Reviewed the pertinent medical history, Discussed the surgical course, Reviewed Intra-OP anesthesia mangement and issues during anesthesia, Set expectations for post-procedure period and Allowed opportunity for questions and acknowledgement of understanding      Vitals:  Vitals Value Taken Time   /59 07/29/24 1015   Temp 96.9  F (36.1  C) 07/29/24 1015   Pulse 85 07/29/24 1015   Resp 16 07/29/24 1015   SpO2 99 % 07/29/24 1015       Electronically Signed By: BANDAR Barton CRNA  July 29, 2024  10:17 AM

## 2024-07-29 NOTE — ANESTHESIA PREPROCEDURE EVALUATION
Anesthesia Pre-Procedure Evaluation    Patient: Isha Valderrama   MRN: [id number] : 1975        Procedure : Procedure(s):  Cystoscopy, Left Ureteroscopy, Left Retrograde Pyelogram, Left Laser Lithotripsy, Possible Left Ureteral Stent Placement          Past Medical History:   Diagnosis Date    Anxiety     Gastroesophageal reflux disease     Kidney stone     Motion sickness     Obese     Uncomplicated asthma       Past Surgical History:   Procedure Laterality Date    KIDNEY STONE SURGERY      Kidney Stone [place] (Dr. Ku)    KNEE SURGERY      ORTHOPEDIC SURGERY      OTHER SURGICAL HISTORY      bilateral ureter stents    TONSILLECTOMY      TONSILLECTOMY        Allergies   Allergen Reactions    Alprazolam Other (See Comments)     Like a zombie   Felt like a Zombi  Like a zombie   Felt like a Zombi      Compazine [Prochlorperazine] Other (See Comments)     Dystonic reaction    Morphine Itching      Social History     Tobacco Use    Smoking status: Every Day     Current packs/day: 1.00     Types: Cigarettes    Smokeless tobacco: Not on file   Substance Use Topics    Alcohol use: No      Wt Readings from Last 1 Encounters:   24 106.6 kg (235 lb)        Anesthesia Evaluation   Pt has had prior anesthetic. Type: General.    History of anesthetic complications (recalls extubation from prior anesthetic)       ROS/MED HX  ENT/Pulmonary:  - neg pulmonary ROS   (+)                tobacco use, Current use,   patient smoked within 24 hours, Intermittent, asthma  Treatment: Inhaler prn,                 Neurologic:  - neg neurologic ROS     Cardiovascular:  - neg cardiovascular ROS     METS/Exercise Tolerance:     Hematologic:  - neg hematologic  ROS     Musculoskeletal:  - neg musculoskeletal ROS (+)  arthritis,             GI/Hepatic:  - neg GI/hepatic ROS   (+) GERD, Symptomatic,                  Renal/Genitourinary:  - neg Renal ROS   (+)       Nephrolithiasis ,       Endo:  - neg endo ROS   (+)             "   Obesity (bmi 41),       Psychiatric/Substance Use:  - neg psychiatric ROS   (+) psychiatric history anxiety       Infectious Disease:  - neg infectious disease ROS     Malignancy:  - neg malignancy ROS     Other:  - neg other ROS          Physical Exam    Airway        Mallampati: II   TM distance: > 3 FB   Neck ROM: full   Mouth opening: > 3 cm    Respiratory Devices and Support         Dental         B=Bridge, C=Chipped, L=Loose, M=Missing    Cardiovascular   cardiovascular exam normal          Pulmonary   pulmonary exam normal                OUTSIDE LABS:  CBC:   Lab Results   Component Value Date    WBC 15.6 (H) 10/02/2023    WBC 13.0 (H) 06/14/2023    HGB 13.6 10/02/2023    HGB 15.3 06/14/2023    HCT 40.6 10/02/2023    HCT 44.5 06/14/2023     10/02/2023     06/14/2023     BMP:   Lab Results   Component Value Date     10/02/2023     06/14/2023    POTASSIUM 4.2 10/02/2023    POTASSIUM 4.3 06/14/2023    CHLORIDE 101 10/02/2023    CHLORIDE 102 06/14/2023    CO2 29 10/02/2023    CO2 30 06/14/2023    BUN 10.6 10/02/2023    BUN 8 06/14/2023    CR 0.59 10/02/2023    CR 0.67 06/14/2023     (H) 10/02/2023    GLC 99 06/14/2023     COAGS: No results found for: \"PTT\", \"INR\", \"FIBR\"  POC:   Lab Results   Component Value Date    HCG Negative 07/29/2024    HCGS Negative 11/06/2019     HEPATIC:   Lab Results   Component Value Date    ALBUMIN 3.7 06/14/2023    PROTTOTAL 6.9 06/14/2023    ALT 15 06/14/2023    AST 19 06/14/2023    ALKPHOS 73 06/14/2023    BILITOTAL 0.5 06/14/2023     OTHER:   Lab Results   Component Value Date    LACT 1.1 01/01/2019    ALMAS 8.6 10/02/2023    MAG 1.7 (L) 11/06/2019    LIPASE 16 06/09/2023    CRP 2.5 (H) 06/14/2023       Anesthesia Plan    ASA Status:  3    NPO Status:  NPO Appropriate    Anesthesia Type: General.     - Airway: ETT   Induction: Propofol, Intravenous.   Maintenance: Balanced.        Consents    Anesthesia Plan(s) and associated risks, benefits, and " "realistic alternatives discussed. Questions answered and patient/representative(s) expressed understanding.     - Discussed:     - Discussed with:  Patient            Postoperative Care    Pain management: Multi-modal analgesia.   PONV prophylaxis: Ondansetron (or other 5HT-3), Dexamethasone or Solumedrol     Comments:    Other Comments: Reviewed anesthetic options and risks, including risk of dental trauma. Patient agrees to proceed.            Levon Moore MD    I have reviewed the pertinent notes and labs in the chart from the past 30 days and (re)examined the patient.  Any updates or changes from those notes are reflected in this note.              # Severe Obesity: Estimated body mass index is 41.63 kg/m  as calculated from the following:    Height as of this encounter: 1.6 m (5' 3\").    Weight as of this encounter: 106.6 kg (235 lb).      "

## 2024-07-29 NOTE — ANESTHESIA PROCEDURE NOTES
Airway       Patient location during procedure: OR       Procedure Start/Stop Times: 7/29/2024 9:30 AM  Staff -        Anesthesiologist:  Levon Moore MD       CRNA: Nohelia Stark APRN CRNA       Performed By: CRNA  Consent for Airway        Urgency: elective  Indications and Patient Condition       Indications for airway management: edwin-procedural       Induction type:intravenous       Mask difficulty assessment: 2 - vent by mask + OA or adjuvant +/- NMBA    Final Airway Details       Final airway type: endotracheal airway       Successful airway: ETT - single and Oral  Endotracheal Airway Details        ETT size (mm): 7.0       Cuffed: yes       Cuff volume (mL): 7       Successful intubation technique: video laryngoscopy       VL Blade Size: Glidescope 3       Grade View of Cords: 1       Adjucts: stylet       Position: Right       Measured from: gums/teeth       Secured at (cm): 20       Bite block used: None    Post intubation assessment        Placement verified by: capnometry, equal breath sounds and chest rise        Number of attempts at approach: 2       Number of other approaches attempted: 1       Secured with: tape       Ease of procedure: easy       Dentition: Intact and Unchanged       Dental guard used and removed. Dental Guard Type: Proguard Red.    Medication(s) Administered   Medication Administration Time: 7/29/2024 9:30 AM    Additional Comments       Vocal cords looked inflamed and hard to pass endotracheal tube through cords with DL attempt x2. Glidescope view good and and able to easily pass ETT through vocal cords.

## 2024-07-29 NOTE — BRIEF OP NOTE
[facility] Brief Operative Note    Pre-operative diagnosis: Left flank pain [R10.9]  Left nephrolithiasis [N20.0]   Post-operative diagnosis left ureteral and renal stone   Procedure: Procedure(s):  Cystoscopy, Left Ureteroscopy, Left Retrograde Pyelogram, Left ureteral stone manipulation, Left Laser Lithotripsy, Basket Stone Removal, Left Ureteral Stent Placement   Surgeon: Rc Marquis MD   Assistants(s): None   Estimated blood loss: 2 ml    Specimens: Left ureteral and renal stones   Findings: Bladder erythema, UTI vs flat tumor  Left ureteral stone moved to renal pelvis  Moderate hydronephrosis  Stone lasered and basketed  Plaques lasered  4.5 fr x 24 cm stent placed ON string

## 2024-07-29 NOTE — ANESTHESIA POSTPROCEDURE EVALUATION
Patient: Isha Valderrama    Procedure: Procedure(s):  Cystoscopy, Left Ureteroscopy, Left Retrograde Pyelogram, Left Laser Lithotripsy, Basket Stone Removal, Left Ureteral Stent Placement       Anesthesia Type:  General    Note:  Disposition: Outpatient   Postop Pain Control: Uneventful            Sign Out: Well controlled pain   PONV: No   Neuro/Psych: Uneventful            Sign Out: Acceptable/Baseline neuro status   Airway/Respiratory: Uneventful            Sign Out: Acceptable/Baseline resp. status   CV/Hemodynamics: Uneventful            Sign Out: Acceptable CV status; No obvious hypovolemia; No obvious fluid overload   Other NRE: NONE   DID A NON-ROUTINE EVENT OCCUR? No    Event details/Postop Comments:  H/o hoarseness, vocal cords did appear somewhat inflamed during laryngoscopy. Long smoking history, with chronic cough.   Unremarkable anesthetic otherwise. Discussed with patient.            Last vitals:  Vitals Value Taken Time   /63 07/29/24 1115   Temp 96.9  F (36.1  C) 07/29/24 1015   Pulse 71 07/29/24 1115   Resp 16 07/29/24 1111   SpO2 94 % 07/29/24 1115   Vitals shown include unfiled device data.    Electronically Signed By: Levon Moore MD  July 29, 2024  12:38 PM

## 2024-07-29 NOTE — DISCHARGE INSTRUCTIONS
If you have any questions or concerns regarding your procedure, please contact Dr. Marquis, his office number is [phone].     You received a medication called Toradol (a stronger IV ibuprofen) at 1000. Do NOT take any Ibuprofen / Advil / Aleve / Naproxen or products containing Ibuprofen until 4:00PM or later.     [facility]   Adult Discharge Orders & Instructions     For 24 hours after surgery    Get plenty of rest.  A responsible adult must stay with you for at least 24 hours after you leave the hospital.   Do not drive or use heavy equipment.  If you have weakness or tingling, don't drive or use heavy equipment until this feeling goes away.  Do not drink alcohol.  Avoid strenuous or risky activities.  Ask for help when climbing stairs.   You may feel lightheaded.  IF so, sit for a few minutes before standing.  Have someone help you get up.   If you have nausea (feel sick to your stomach): Drink only clear liquids such as apple juice, ginger ale, broth or 7-Up.  Rest may also help.  Be sure to drink enough fluids.  Move to a regular diet as you feel able.  You may have a slight fever. Call the doctor if your fever is over 100 F (37.7 C) (taken under the tongue) or lasts longer than 24 hours.  You may have a dry mouth, a sore throat, muscle aches or trouble sleeping.  These should go away after 24 hours.  Do not make important or legal decisions.   Call your doctor for any of the followin.  Signs of infection (fever, growing tenderness at the surgery site, a large amount of drainage or bleeding, severe pain, foul-smelling drainage, redness, swelling).    2. It has been over 8 to 10 hours since surgery and you are still not able to urinate (pass water).    3.  Headache for over 24 hours.     Post-Operative Symptom Control    While you recover from your procedure, you can take steps to ease your recovery.    Diet and Fluids:    Eating your normal diet is fine.  Drink a little more than  "normal but you don not have to \"flush\" your system.    Activity:    There are no activity restrictions after stone surgery.  Some people find bending twisting movements cause discomfort or increased blood in urine.  Activity may irritating but you will not hurt yourself.    Medications: (That may be suggested or prescribed)    Ibuprofen (Advil/Motrin)-Available over the counter.  Take 2 (200mg) tablets at bedtime and every six hour for base pain management.      Dramamine (brand name drowsy version)-Is available over the counter.  Take 50 mg at bedtime and every 6 hours as needed.  This medication can be helpful by:   Decreasing nausea   Decrease acute pain   Decrease recurrence of pain for 24 hours  *This medication my cause increased drowsiness, do not drive or operate machinery within 6 hours.    Flomax (Tamsulosin)-After surgery Flomax has several potential benefits   Decreased stent discomfort   Increased likelihood passage of small stone fragments   Take daily with food until your stent is removed  *This may cause nasal congestion or light-headedness    Detrol (Tolterodine)-After surgery Detrol may decrease stent irritation and pelvic pain   Take as prescribed  *This medication may cause dry mouth, constipation or blurry vision.    Narcotics (oxycodone)   Take as prescribed for severe pain   Narcotics have significant side effects and only \"cover-up\" pain.  They have no effect on cause of pain.     Common side effects  Confusion, disorientation and sedation-DO NOT DRIVE OR OPERATE MACHINERY WITH IN 24 HOURS OF TAKING NARCOTICS    Nausea-take Dramamine or Zofran to help control  Constipation  Sleep Disturbances    Call the Clinic Immediately If You Have Any Of The Following Symptoms:   Nausea/vomiting that is uncontrolled with medications   You have a fever over 100.0   Chills   Are not able to urinate for 8 hours    Increasing back pain that is not relieved with pain medications   Large amounts of blood in " "urine or large clots    We can respond to your questions or concerns 24 hours a day at [phone].   For after hours phone calls please wait on call to be connected with the \"care connection\" service for after hours care.     Going Home With a Ureteral Stent    What is it?  A stent is a soft, plastic tube that helps urine (pee) drain into the bladder.  During the surgery, it is placed in the ureter the tube that connects the kidney to the bladder.  A thin curl at each end of the stent keeps one end in your kidney and the other in your bladder.  The stent can not be seen from outside of the body.    Why Do I Have It?  Some sort of blockage is not letting pee drain into your bladder.  This could be from a stone, certain surgeries or kidney infection.    What Should I Expect?   Stents often cause some discomfort.  You may have:    The need to pee suddenly    Pain when you pee    A dull backache, which may get worse when you pee  Blood in your pee (color of fruit punch) and some clots, which may increase with physical activity.     What Can I Do To Feel Better?    Drink a little more fluids than usual.  You can eat your normal diet.    Enjoy a warm bath.  Decrease your activity.  Some people find bending or twisting movement cause discomfort or increased blood in the urine.  Even so, you will not harm yourself.    Your stent can be removed on Thursday 8/1/2024 by pulling on the string until you see both curls.  If the stent is left in more than 3 months, it can lead to a need for procedure in order to remove it, permanent kidney damage or loss.    Follow up:  We will have you follow-up in clinic in 2 to 3 months with a cystoscopy, urine cytology, 24 hr urine collection and renal US    Please complete your lab testing and 24 hr urine testing around the same time, at least 2 weeks prior to your follow up appointment.      You received a medication called Toradol (a stronger IV ibuprofen) at 9:48 am. Do NOT take any " Ibuprofen / Advil / Aleve / Naproxen or products containing Ibuprofen until 3:48 pm or later.

## 2024-07-30 NOTE — OP NOTE
OPERATIVE REPORT    PREOPERATIVE DIAGNOSIS:  Left flank pain, ureteral stone, renal stone  POSTOPERATIVE DIAGNOSIS: Same    PROCEDURES PERFORMED:   Cystoscopy  left retrograde pyelogram  Left ureteral stone manipulation  Left ureteroscopy with laser lithotripsy and stone basket extraction  left ureteral stent placement  Interpretation of fluoroscopic images <60 min    STAFF SURGEON: Rc Marquis MD  ASSISTANT(S): None   ANESTHESIA: General  ESTIMATED BLOOD LOSS: 2 ml  COMPLICATIONS: None.   SPECIMEN: left renal and ureteral stone for analysis    SIGNIFICANT FINDINGS:   Bladder erythema, UTI vs flat tumor  Left ureteral stone moved to renal pelvis  Moderate hydronephrosis  Stone lasered and basketed  Plaques lasered  4.5 fr x 24 cm stent placed ON string    BRIEF OPERATIVE INDICATIONS: Isha Valderrama  is a(n) 49 year old year old female  who presented with a left ureteral stone with symptoms as well as left renal stone.  After a discussion of all risks, benefits, and alternatives, the patient elected to proceed with left ureteroscopy.    DESCRIPTION OF PROCEDURE:  After informed consent was obtained, the patient was transported to the operating room & placed supine on the table. After adequate anesthesia was induced, the patient was placed in lithotomy and prepped and draped in the usual sterile fashion. A timeout was taken to confirm correct patient, procedure and laterality. Pre-operative IV antibiotics were administered.     Cystoscopy: Rigid cystoscopy is performed. Introitus is with mild cystocele. Bladder has some erythema and irritation on the left posterior wall.  This could be consistent with urinary tract infection versus something like CIS.  Not bothersome enough to biopsy at this time and will reassess after 2 months.     Ureteral Access: Left ureteral access is initiated with Bentson wire.      Retrograde Pyelography: A dual-lumen catheter was passed to the mid ureter is radiopaque stone.  Treated  pyelogram demonstrated mild/moderate hydronephrosis above the level of the stone.      The stone was pushed into the renal pelvis with a dual-lumen catheter.    Flexible Ureterorenoscopy: Flexible ureteroscope is passed to kidney. Left ureteral stone was visualized in the renal pelvis. Stone is translocated to dependent renal upper pole calyx where laser lithotripsy is performed with  generation of fine debris.  A few smaller plugs were lasered off the kidney.  A few pieces larger than 3 mm removed by basket extraction.  Pullback ureteroscopy demonstrated slight ureteral irritation from the location of the prior ureteral stone.    Ureteral Stent Insertion: A left 4.5 German by 24 cm Cook stent was placed on a string with good proximal and distal curl.    They were awakened from anesthesia and transferred to the PACU.       POSTOP PLAN:   stent removal on Thursday   follow-up in 8 weeks with  renal ultrasound  and metabolic testing

## 2024-07-31 LAB
APPEARANCE STONE: NORMAL
COMPN STONE: NORMAL
SPECIMEN WT: 26 MG

## 2024-08-01 ENCOUNTER — APPOINTMENT (OUTPATIENT)
Dept: CT IMAGING | Facility: CLINIC | Age: 49
End: 2024-08-01
Attending: EMERGENCY MEDICINE
Payer: COMMERCIAL

## 2024-08-01 ENCOUNTER — HOSPITAL ENCOUNTER (OUTPATIENT)
Facility: CLINIC | Age: 49
Setting detail: OBSERVATION
Discharge: HOME | End: 2024-08-02
Attending: EMERGENCY MEDICINE | Admitting: FAMILY MEDICINE
Payer: COMMERCIAL

## 2024-08-01 DIAGNOSIS — F17.200 TOBACCO USE DISORDER: ICD-10-CM

## 2024-08-01 DIAGNOSIS — N23 RENAL COLIC ON LEFT SIDE: ICD-10-CM

## 2024-08-01 DIAGNOSIS — N13.4 HYDROURETER ON LEFT: ICD-10-CM

## 2024-08-01 DIAGNOSIS — N20.0 RECURRENT KIDNEY STONES: Primary | ICD-10-CM

## 2024-08-01 LAB
ALBUMIN SERPL BCG-MCNC: 3.9 G/DL (ref 3.5–5.2)
ALBUMIN UR-MCNC: 200 MG/DL
ALP SERPL-CCNC: 80 U/L (ref 40–150)
ALT SERPL W P-5'-P-CCNC: 14 U/L (ref 0–50)
ANION GAP SERPL CALCULATED.3IONS-SCNC: 8 MMOL/L (ref 7–15)
APPEARANCE UR: ABNORMAL
AST SERPL W P-5'-P-CCNC: 16 U/L (ref 0–45)
BASOPHILS # BLD AUTO: 0.1 10E3/UL (ref 0–0.2)
BASOPHILS NFR BLD AUTO: 1 %
BILIRUB SERPL-MCNC: 0.3 MG/DL
BILIRUB UR QL STRIP: NEGATIVE
BUN SERPL-MCNC: 11.4 MG/DL (ref 6–20)
CALCIUM SERPL-MCNC: 9.4 MG/DL (ref 8.8–10.4)
CHLORIDE SERPL-SCNC: 103 MMOL/L (ref 98–107)
COLOR UR AUTO: ABNORMAL
CREAT SERPL-MCNC: 0.72 MG/DL (ref 0.51–0.95)
EGFRCR SERPLBLD CKD-EPI 2021: >90 ML/MIN/1.73M2
EOSINOPHIL # BLD AUTO: 0.3 10E3/UL (ref 0–0.7)
EOSINOPHIL NFR BLD AUTO: 2 %
ERYTHROCYTE [DISTWIDTH] IN BLOOD BY AUTOMATED COUNT: 14.2 % (ref 10–15)
GLUCOSE SERPL-MCNC: 97 MG/DL (ref 70–99)
GLUCOSE UR STRIP-MCNC: NEGATIVE MG/DL
HCO3 SERPL-SCNC: 29 MMOL/L (ref 22–29)
HCT VFR BLD AUTO: 42.9 % (ref 35–47)
HGB BLD-MCNC: 14.7 G/DL (ref 11.7–15.7)
HGB UR QL STRIP: ABNORMAL
IMM GRANULOCYTES # BLD: 0.1 10E3/UL
IMM GRANULOCYTES NFR BLD: 1 %
KETONES UR STRIP-MCNC: NEGATIVE MG/DL
LEUKOCYTE ESTERASE UR QL STRIP: ABNORMAL
LYMPHOCYTES # BLD AUTO: 3.3 10E3/UL (ref 0.8–5.3)
LYMPHOCYTES NFR BLD AUTO: 21 %
MCH RBC QN AUTO: 32.2 PG (ref 26.5–33)
MCHC RBC AUTO-ENTMCNC: 34.3 G/DL (ref 31.5–36.5)
MCV RBC AUTO: 94 FL (ref 78–100)
MONOCYTES # BLD AUTO: 1.3 10E3/UL (ref 0–1.3)
MONOCYTES NFR BLD AUTO: 8 %
MUCOUS THREADS #/AREA URNS LPF: PRESENT /LPF
NEUTROPHILS # BLD AUTO: 10.5 10E3/UL (ref 1.6–8.3)
NEUTROPHILS NFR BLD AUTO: 68 %
NITRATE UR QL: NEGATIVE
NRBC # BLD AUTO: 0 10E3/UL
NRBC BLD AUTO-RTO: 0 /100
PH UR STRIP: 6.5 [PH] (ref 5–7)
PLATELET # BLD AUTO: 448 10E3/UL (ref 150–450)
POTASSIUM SERPL-SCNC: 4.8 MMOL/L (ref 3.4–5.3)
PROT SERPL-MCNC: 6.7 G/DL (ref 6.4–8.3)
RBC # BLD AUTO: 4.56 10E6/UL (ref 3.8–5.2)
RBC URINE: >182 /HPF
SODIUM SERPL-SCNC: 140 MMOL/L (ref 135–145)
SP GR UR STRIP: 1.04 (ref 1–1.03)
UROBILINOGEN UR STRIP-MCNC: <2 MG/DL
WBC # BLD AUTO: 15.5 10E3/UL (ref 4–11)
WBC URINE: 92 /HPF

## 2024-08-01 PROCEDURE — G0378 HOSPITAL OBSERVATION PER HR: HCPCS

## 2024-08-01 PROCEDURE — 81001 URINALYSIS AUTO W/SCOPE: CPT | Performed by: EMERGENCY MEDICINE

## 2024-08-01 PROCEDURE — 96361 HYDRATE IV INFUSION ADD-ON: CPT

## 2024-08-01 PROCEDURE — 250N000011 HC RX IP 250 OP 636: Performed by: STUDENT IN AN ORGANIZED HEALTH CARE EDUCATION/TRAINING PROGRAM

## 2024-08-01 PROCEDURE — 87086 URINE CULTURE/COLONY COUNT: CPT | Performed by: EMERGENCY MEDICINE

## 2024-08-01 PROCEDURE — 250N000011 HC RX IP 250 OP 636: Performed by: EMERGENCY MEDICINE

## 2024-08-01 PROCEDURE — 85025 COMPLETE CBC W/AUTO DIFF WBC: CPT | Performed by: EMERGENCY MEDICINE

## 2024-08-01 PROCEDURE — 85004 AUTOMATED DIFF WBC COUNT: CPT | Performed by: EMERGENCY MEDICINE

## 2024-08-01 PROCEDURE — 96375 TX/PRO/DX INJ NEW DRUG ADDON: CPT

## 2024-08-01 PROCEDURE — 250N000013 HC RX MED GY IP 250 OP 250 PS 637

## 2024-08-01 PROCEDURE — 99207 PR APP CREDIT; MD BILLING SHARED VISIT: CPT

## 2024-08-01 PROCEDURE — 82040 ASSAY OF SERUM ALBUMIN: CPT | Performed by: EMERGENCY MEDICINE

## 2024-08-01 PROCEDURE — 96376 TX/PRO/DX INJ SAME DRUG ADON: CPT

## 2024-08-01 PROCEDURE — 258N000003 HC RX IP 258 OP 636: Performed by: EMERGENCY MEDICINE

## 2024-08-01 PROCEDURE — 36415 COLL VENOUS BLD VENIPUNCTURE: CPT | Performed by: EMERGENCY MEDICINE

## 2024-08-01 PROCEDURE — 250N000013 HC RX MED GY IP 250 OP 250 PS 637: Performed by: FAMILY MEDICINE

## 2024-08-01 PROCEDURE — 258N000003 HC RX IP 258 OP 636

## 2024-08-01 PROCEDURE — 99285 EMERGENCY DEPT VISIT HI MDM: CPT | Mod: 25

## 2024-08-01 PROCEDURE — 250N000013 HC RX MED GY IP 250 OP 250 PS 637: Performed by: EMERGENCY MEDICINE

## 2024-08-01 PROCEDURE — 74177 CT ABD & PELVIS W/CONTRAST: CPT

## 2024-08-01 PROCEDURE — 80053 COMPREHEN METABOLIC PANEL: CPT | Performed by: EMERGENCY MEDICINE

## 2024-08-01 PROCEDURE — 250N000011 HC RX IP 250 OP 636

## 2024-08-01 PROCEDURE — 96374 THER/PROPH/DIAG INJ IV PUSH: CPT | Mod: 59

## 2024-08-01 PROCEDURE — 99223 1ST HOSP IP/OBS HIGH 75: CPT | Mod: FS | Performed by: FAMILY MEDICINE

## 2024-08-01 RX ORDER — LORAZEPAM 0.5 MG/1
.5-1 TABLET ORAL
COMMUNITY

## 2024-08-01 RX ORDER — ALBUTEROL SULFATE 90 UG/1
1-2 AEROSOL, METERED RESPIRATORY (INHALATION) EVERY 4 HOURS PRN
COMMUNITY
Start: 2024-06-01 | End: 2024-08-01

## 2024-08-01 RX ORDER — ACETAMINOPHEN 500 MG
500-1000 TABLET ORAL EVERY 6 HOURS PRN
COMMUNITY

## 2024-08-01 RX ORDER — DULOXETIN HYDROCHLORIDE 60 MG/1
60 CAPSULE, DELAYED RELEASE ORAL DAILY
Status: DISCONTINUED | OUTPATIENT
Start: 2024-08-02 | End: 2024-08-02 | Stop reason: HOSPADM

## 2024-08-01 RX ORDER — AMOXICILLIN 250 MG
1 CAPSULE ORAL 2 TIMES DAILY PRN
Status: DISCONTINUED | OUTPATIENT
Start: 2024-08-01 | End: 2024-08-02 | Stop reason: HOSPADM

## 2024-08-01 RX ORDER — TOLTERODINE 2 MG/1
2 CAPSULE, EXTENDED RELEASE ORAL DAILY PRN
Status: DISCONTINUED | OUTPATIENT
Start: 2024-08-02 | End: 2024-08-02 | Stop reason: HOSPADM

## 2024-08-01 RX ORDER — ACETAMINOPHEN 325 MG/1
650 TABLET ORAL EVERY 4 HOURS PRN
Status: DISCONTINUED | OUTPATIENT
Start: 2024-08-01 | End: 2024-08-02 | Stop reason: HOSPADM

## 2024-08-01 RX ORDER — KETOROLAC TROMETHAMINE 15 MG/ML
15 INJECTION, SOLUTION INTRAMUSCULAR; INTRAVENOUS ONCE
Status: COMPLETED | OUTPATIENT
Start: 2024-08-01 | End: 2024-08-01

## 2024-08-01 RX ORDER — ALBUTEROL SULFATE 90 UG/1
1-2 AEROSOL, METERED RESPIRATORY (INHALATION) EVERY 6 HOURS PRN
Status: DISCONTINUED | OUTPATIENT
Start: 2024-08-01 | End: 2024-08-02 | Stop reason: HOSPADM

## 2024-08-01 RX ORDER — ONDANSETRON 2 MG/ML
4 INJECTION INTRAMUSCULAR; INTRAVENOUS
Status: COMPLETED | OUTPATIENT
Start: 2024-08-01 | End: 2024-08-01

## 2024-08-01 RX ORDER — ALBUTEROL SULFATE 0.83 MG/ML
2.5 SOLUTION RESPIRATORY (INHALATION) EVERY 6 HOURS PRN
COMMUNITY
Start: 2024-03-06

## 2024-08-01 RX ORDER — ACETAMINOPHEN 650 MG/1
650 SUPPOSITORY RECTAL EVERY 4 HOURS PRN
Status: DISCONTINUED | OUTPATIENT
Start: 2024-08-01 | End: 2024-08-02 | Stop reason: HOSPADM

## 2024-08-01 RX ORDER — HYDROMORPHONE HCL IN WATER/PF 6 MG/30 ML
0.2 PATIENT CONTROLLED ANALGESIA SYRINGE INTRAVENOUS
Status: DISCONTINUED | OUTPATIENT
Start: 2024-08-01 | End: 2024-08-02 | Stop reason: HOSPADM

## 2024-08-01 RX ORDER — OXYCODONE HYDROCHLORIDE 5 MG/1
5 TABLET ORAL EVERY 4 HOURS PRN
Status: DISCONTINUED | OUTPATIENT
Start: 2024-08-01 | End: 2024-08-02 | Stop reason: HOSPADM

## 2024-08-01 RX ORDER — HYDROMORPHONE HCL IN WATER/PF 6 MG/30 ML
0.4 PATIENT CONTROLLED ANALGESIA SYRINGE INTRAVENOUS
Status: DISCONTINUED | OUTPATIENT
Start: 2024-08-01 | End: 2024-08-02 | Stop reason: HOSPADM

## 2024-08-01 RX ORDER — IOPAMIDOL 755 MG/ML
90 INJECTION, SOLUTION INTRAVASCULAR ONCE
Status: COMPLETED | OUTPATIENT
Start: 2024-08-01 | End: 2024-08-01

## 2024-08-01 RX ORDER — ONDANSETRON 4 MG/1
4 TABLET, ORALLY DISINTEGRATING ORAL EVERY 6 HOURS PRN
Status: DISCONTINUED | OUTPATIENT
Start: 2024-08-01 | End: 2024-08-02 | Stop reason: HOSPADM

## 2024-08-01 RX ORDER — DULOXETIN HYDROCHLORIDE 30 MG/1
30 CAPSULE, DELAYED RELEASE ORAL DAILY
Status: DISCONTINUED | OUTPATIENT
Start: 2024-08-02 | End: 2024-08-02 | Stop reason: HOSPADM

## 2024-08-01 RX ORDER — OXYCODONE HYDROCHLORIDE 5 MG/1
10 TABLET ORAL ONCE
Status: COMPLETED | OUTPATIENT
Start: 2024-08-01 | End: 2024-08-01

## 2024-08-01 RX ORDER — ONDANSETRON 2 MG/ML
4 INJECTION INTRAMUSCULAR; INTRAVENOUS EVERY 6 HOURS PRN
Status: DISCONTINUED | OUTPATIENT
Start: 2024-08-01 | End: 2024-08-02 | Stop reason: HOSPADM

## 2024-08-01 RX ORDER — AMOXICILLIN 250 MG
2 CAPSULE ORAL 2 TIMES DAILY PRN
Status: DISCONTINUED | OUTPATIENT
Start: 2024-08-01 | End: 2024-08-02 | Stop reason: HOSPADM

## 2024-08-01 RX ORDER — CEPHALEXIN 500 MG/1
500 CAPSULE ORAL 2 TIMES DAILY
Status: DISCONTINUED | OUTPATIENT
Start: 2024-08-01 | End: 2024-08-02 | Stop reason: HOSPADM

## 2024-08-01 RX ORDER — NICOTINE 21 MG/24HR
1 PATCH, TRANSDERMAL 24 HOURS TRANSDERMAL DAILY
Status: DISCONTINUED | OUTPATIENT
Start: 2024-08-01 | End: 2024-08-02 | Stop reason: HOSPADM

## 2024-08-01 RX ORDER — SODIUM CHLORIDE 9 MG/ML
INJECTION, SOLUTION INTRAVENOUS CONTINUOUS
Status: DISCONTINUED | OUTPATIENT
Start: 2024-08-01 | End: 2024-08-02 | Stop reason: HOSPADM

## 2024-08-01 RX ORDER — ALBUTEROL SULFATE 90 UG/1
1-2 AEROSOL, METERED RESPIRATORY (INHALATION) EVERY 6 HOURS PRN
COMMUNITY

## 2024-08-01 RX ADMIN — CEPHALEXIN 500 MG: 500 CAPSULE ORAL at 20:10

## 2024-08-01 RX ADMIN — ACETAMINOPHEN 650 MG: 325 TABLET ORAL at 17:33

## 2024-08-01 RX ADMIN — SODIUM CHLORIDE 1000 ML: 9 INJECTION, SOLUTION INTRAVENOUS at 15:35

## 2024-08-01 RX ADMIN — ONDANSETRON 4 MG: 2 INJECTION INTRAMUSCULAR; INTRAVENOUS at 13:02

## 2024-08-01 RX ADMIN — SODIUM CHLORIDE: 9 INJECTION, SOLUTION INTRAVENOUS at 17:32

## 2024-08-01 RX ADMIN — NICOTINE 1 PATCH: 21 PATCH, EXTENDED RELEASE TRANSDERMAL at 18:28

## 2024-08-01 RX ADMIN — HYDROMORPHONE HYDROCHLORIDE 0.4 MG: 0.2 INJECTION, SOLUTION INTRAMUSCULAR; INTRAVENOUS; SUBCUTANEOUS at 17:33

## 2024-08-01 RX ADMIN — HYDROMORPHONE HYDROCHLORIDE 0.4 MG: 0.2 INJECTION, SOLUTION INTRAMUSCULAR; INTRAVENOUS; SUBCUTANEOUS at 20:10

## 2024-08-01 RX ADMIN — OXYCODONE 10 MG: 5 TABLET ORAL at 14:18

## 2024-08-01 RX ADMIN — KETOROLAC TROMETHAMINE 15 MG: 15 INJECTION, SOLUTION INTRAMUSCULAR; INTRAVENOUS at 14:14

## 2024-08-01 RX ADMIN — HYDROMORPHONE HYDROCHLORIDE 1 MG: 1 INJECTION, SOLUTION INTRAMUSCULAR; INTRAVENOUS; SUBCUTANEOUS at 15:37

## 2024-08-01 RX ADMIN — IOPAMIDOL 90 ML: 755 INJECTION, SOLUTION INTRAVENOUS at 14:51

## 2024-08-01 RX ADMIN — ACETAMINOPHEN 650 MG: 325 TABLET ORAL at 22:15

## 2024-08-01 ASSESSMENT — ACTIVITIES OF DAILY LIVING (ADL)
ADLS_ACUITY_SCORE: 35

## 2024-08-01 NOTE — MEDICATION SCRIBE - ADMISSION MEDICATION HISTORY
"Medication Scribe Admission Medication History    Admission medication history is complete. The information provided in this note is only as accurate as the sources available at the time of the update.    Information Source(s): Patient and CareEverywhere/SureScripts via in-person    Pertinent Information: Patient started cephalexin BID on Monday post surgery. Has not missed any doses and took last dose this morning. Has likely been taking over the daily maximum of ibuprofen and acetaminophen since Monday 7/29. Had last oxycodone 5 mg this morning. Finished tamsulosin on 7/29.     Changes made to PTA medication list:  Added:   Albuterol 0.083% neb sol  Deleted:   Medroxyprogesterone acetate IM  Oxycodone 5 mg (took last tablet this morning)  PEG 17 g  Senna-docusate   Sertraline \"PO\"  Changed:   Added SIG to albuterol HFA  Changed fluticasone inhalation to PRN  Changed lorazepam to PRN dental appt    Allergies reviewed with patient and updates made in EHR: yes    Medication History Completed By: Servando Garza 8/1/2024 4:13 PM    PTA Med List   Medication Sig Last Dose    acetaminophen (TYLENOL) 500 MG tablet Take 500-1,000 mg by mouth every 6 hours as needed for mild pain (do not exceed more than 4,000 mg in 24 hours) 8/1/2024 at AM; 1,000 (likely over 4g/day)    albuterol (PROAIR HFA/PROVENTIL HFA/VENTOLIN HFA) 108 (90 Base) MCG/ACT inhaler Inhale 1-2 puffs into the lungs every 6 hours as needed for shortness of breath, wheezing or cough 8/1/2024 at AM; has with    albuterol (PROVENTIL) (2.5 MG/3ML) 0.083% neb solution Take 2.5 mg by nebulization every 6 hours as needed More than a month at PRN    cephALEXin (KEFLEX) 500 MG capsule Take 1 capsule (500 mg) by mouth 2 times daily for 7 days 8/1/2024 at AM; 1 of 2    DULoxetine (CYMBALTA) 30 MG capsule Take 1 Capsule (30 mg) by mouth daily. Take with 60mg capsule to equal 90mg* 8/1/2024 at AM    DULoxetine (CYMBALTA) 60 MG capsule Take 60 mg by mouth daily Taken " with 30 mg capsule 8/1/2024 at AM    Fluticasone Propionate, Inhal, (FLOVENT IN) Inhale 1 puff into the lungs daily as needed Formulation not known More than a month at PRN    ibuprofen (ADVIL/MOTRIN) 200 MG tablet Take 3 tablets (600 mg) by mouth every 6 hours as needed for mild pain 8/1/2024 at AM; reports over 2,500 mg in last 24 hours    LORazepam (ATIVAN) 0.5 MG tablet Take 0.5-1 mg by mouth once as needed for anxiety (use 30 min prior to dental appointmnet) More than a month at PRN dental    tolterodine ER (DETROL LA) 2 MG 24 hr capsule Take 1 capsule (2 mg) by mouth daily as needed (bladder spasms/urinary urgency) 8/1/2024 at AM

## 2024-08-01 NOTE — ED TRIAGE NOTES
Pt presents with left flank pain onset about an hour ago after having stent removal for kidney stone 2 hours prior. Has surgery due to kidney stone 4 days prior.      Triage Assessment (Adult)       Row Name 08/01/24 1253 08/01/24 1252       Triage Assessment    Airway WDL WDL WDL       Respiratory WDL    Respiratory WDL WDL WDL       Skin Circulation/Temperature WDL    Skin Circulation/Temperature WDL WDL WDL       Cardiac WDL    Cardiac WDL WDL WDL       Peripheral/Neurovascular WDL    Peripheral Neurovascular WDL WDL WDL       Cognitive/Neuro/Behavioral WDL    Cognitive/Neuro/Behavioral WDL WDL WDL

## 2024-08-01 NOTE — ED PROVIDER NOTES
EMERGENCY DEPARTMENT ENCOUNTER      NAME: Isha Valderrama  AGE: 49 year old female  YOB: 1975  MRN: [id number]  EVALUATION DATE & TIME: No admission date for patient encounter.    PCP: Dewayne Barrera    ED PROVIDER: Ignacio Basurto M.D.      Chief Complaint   Patient presents with    Flank Pain         FINAL IMPRESSION:  Left renal colic  Left hydroureter    ED COURSE & MEDICAL DECISION MAKING:    Pertinent Labs & Imaging studies reviewed. (See chart for details)  49 year old female presents to the Emergency Department for evaluation of sudden left flank pain.  Patient with a history of recurrent renal colic.  Records indicate patient had cystoscopy formed on 7/29/2024 and had left renal stent placed.  She was instructed to remove it today.  Prior to removal she had only mild discomfort.  Almost immediately after removing it she had onset of severe left flank pain.  Has been taking oxycodone without improvement.  Some nausea without vomiting.  Seen in triage area due to ED overcrowding symptomatology.  She is in marked distress with moderate hypertension.  Heart and lungs unremarkable.  Abdomen obese with mild left upper quadrant tenderness but marked left CVA tenderness.  Concern is potential complications with stent removal.  Possible acute obstruction versus injury to the ureter.  Laboratory evaluation initiated.  Patient treated symptomatically with intravenous Toradol and Zofran.  Also given oral oxycodone for additional relief.  Parenteral opiates not available secondary to presence in triage area.  Will obtain CT imaging to assess for complications.. Patient appears non toxic       2:16 PM  I met with the patient for the initial interview and physical examination. Discussed plan for treatment and workup in the ED.  Zofran given and Toradol administered.  3:01 PM.  White cell count mildly out of 15.5.  Comprehensive metabolic panel unremarkable.  Hemoglobin normal.  CT images reviewed.   Patient with marked proximal hydronephrosis on the left.  Appears to be more advanced than on scan of July 19, 2024.  Previously noted ureteral lithiasis absent.  Possibility of it being in the pelvis but multiple phleboliths complicating interpretation.  Slight increased density at the left UPJ suggesting sludge.  Awaiting definitive report.  Call placed to urology.  3:20 PM.  Call placed to the admitting physician.  Patient will require admission due to intractable pain.  3:23 PM.  Patient discussed with Dr. Dickerson who reports often ureteral edema and spasm can cause similar presentation.  Recommends hospitalization for pain management.  N.p.o. in case symptoms do not subside and stent needs to be replaced.  3:25 PM.  Patient informed of plan.  Patient reports minimal improvement with oral medications.  Intravenous Dilaudid 1 mg ordered.  Awaiting call from admitting physician.  3:30 PM.  Patient discussed with Dr. Stokes.  He is agreeable plans.  Patient be observation status.  At the conclusion of the encounter I discussed the results of all of the tests and the disposition. The questions were answered and return precautions provided. The patient or family acknowledged understanding and was agreeable with the care plan.       PPE: NA    MEDICATIONS GIVEN IN THE EMERGENCY:  Medications   oxyCODONE (ROXICODONE) tablet 10 mg (has no administration in time range)   ondansetron (ZOFRAN) injection 4 mg (4 mg Intravenous $Given 8/1/24 1302)   ketorolac (TORADOL) injection 15 mg (15 mg Intravenous $Given 8/1/24 1414)       NEW PRESCRIPTIONS STARTED AT TODAY'S ER VISIT  New Prescriptions    No medications on file          =================================================================    HPI    Patient information was obtained from: patient     Use of Intrepreter: N/A         Isha Valderrama is a 49 year old female with a pertient medical history of recurrent kidney stones, tobacco use, anxiety, and depression  "who presents to the ED for evaluation of flank pain.     The patient had a stent placed for a left kidney stone on 7/29 and was told to pull the stent on her own this morning at 10:30 AM. At 11:30 AM she developed left flank tenderness that \"feels like I'm in labor\". She took an oxycodone at home but is still tearful from the pain and asking for additional pain medication. She has had recurrent stones in the past but never had issues with stent removal like this. She has associated nausea and vomiting x1 from the pain now.     Per chart review, the patient had a cystoscopy and had a 4.5 x 24 cm stent placed ON string on 7/29. She called her urologist today and informed them that she would be going to the ED. ([facility])      REVIEW OF SYSTEMS   Constitutional:  Denies fever, chills  Respiratory:  Denies productive cough or increased work of breathing  Cardiovascular:  Denies chest pain, palpitations  GI:  Positive for abdominal pain, nausea, vomiting  Musculoskeletal:  Denies any new muscle/joint swelling  Skin:  Denies rash   Neurologic:  Denies focal weakness  All systems negative except as marked.     PAST MEDICAL HISTORY:  Past Medical History:   Diagnosis Date    Anxiety     Gastroesophageal reflux disease     Kidney stone     Motion sickness     Obese     Uncomplicated asthma        PAST SURGICAL HISTORY:  Past Surgical History:   Procedure Laterality Date    COMBINED CYSTOSCOPY, RETROGRADES, URETEROSCOPY, LASER HOLMIUM LITHOTRIPSY URETER(S), INSERT STENT Left 7/29/2024    Procedure: Cystoscopy, Left Ureteroscopy, Left Retrograde Pyelogram, Left Laser Lithotripsy, Basket Stone Removal, Left Ureteral Stent Placement;  Surgeon: Rc Marquis MD;  Location: [facility] OR    KIDNEY STONE SURGERY      Kidney Stone [place] (Dr. Ku)    KNEE SURGERY      ORTHOPEDIC SURGERY      OTHER SURGICAL HISTORY      bilateral ureter stents    TONSILLECTOMY      TONSILLECTOMY   "         CURRENT MEDICATIONS:      Current Facility-Administered Medications:     oxyCODONE (ROXICODONE) tablet 10 mg, 10 mg, Oral, Once, Ignacio Basurto MD    Current Outpatient Medications:     Acetaminophen (TYLENOL PO), Take 1,000 mg by mouth every 6 hours as needed, Disp: , Rfl:     ALBUTEROL IN, , Disp: , Rfl:     cephALEXin (KEFLEX) 500 MG capsule, Take 1 capsule (500 mg) by mouth 2 times daily for 7 days, Disp: 14 capsule, Rfl: 0    DULoxetine (CYMBALTA) 30 MG capsule, Take 1 Capsule (30 mg) by mouth daily. Take with 60mg capsule to equal 90mg*, Disp: , Rfl:     DULoxetine (CYMBALTA) 60 MG capsule, Take 1 Capsule (60 mg) by mouth daily.*, Disp: , Rfl:     Fluticasone Propionate, Inhal, (FLOVENT IN), , Disp: , Rfl:     ibuprofen (ADVIL/MOTRIN) 200 MG tablet, Take 3 tablets (600 mg) by mouth every 6 hours as needed for mild pain, Disp: 60 tablet, Rfl: 0    LORAZEPAM PO, , Disp: , Rfl:     MedroxyPROGESTERone Acetate (DEPO-PROVERA IM), , Disp: , Rfl:     oxyCODONE (ROXICODONE) 5 MG tablet, Take 1 tablet (5 mg) by mouth every 6 hours as needed for pain, Disp: 12 tablet, Rfl: 0    polyethylene glycol (MIRALAX) 17 GM/Dose powder, Take 17 g (1 Capful) by mouth daily, Disp: 238 g, Rfl: 0    SENNA-docusate sodium (SENNA S) 8.6-50 MG tablet, Take 1 tablet by mouth nightly as needed (constipation), Disp: , Rfl:     SERTRALINE HCL PO, Take by mouth daily, Disp: , Rfl:     tolterodine ER (DETROL LA) 2 MG 24 hr capsule, Take 1 capsule (2 mg) by mouth daily as needed (bladder spasms/urinary urgency), Disp: 7 capsule, Rfl: 0    ALLERGIES:  Allergies   Allergen Reactions    Alprazolam Other (See Comments)     Like a zombie   Felt like a Zombi  Like a zombie   Felt like a Zombi      Compazine [Prochlorperazine] Other (See Comments)     Dystonic reaction    Morphine Itching       FAMILY HISTORY:  Family History   Problem Relation Age of Onset    Urolithiasis Father         recurrent    Urolithiasis Paternal Aunt          "recurrent    Urolithiasis Paternal Grandmother         recurrent       SOCIAL HISTORY:   Social History     Socioeconomic History    Marital status: Single   Tobacco Use    Smoking status: Every Day     Current packs/day: 1.00     Types: Cigarettes   Substance and Sexual Activity    Alcohol use: No    Drug use: No   Social History Narrative    Lives with family.       VITALS:  Patient Vitals for the past 24 hrs:   BP Temp Temp src Pulse Resp SpO2 Height Weight   08/01/24 1254 (!) 186/106 98  F (36.7  C) Temporal 64 20 99 % 1.6 m (5' 3\") 106.6 kg (235 lb)        PHYSICAL EXAM    Constitutional:  Awake, alert, in moderate to marked distress, obese, tearful  HENT:  Normocephalic, Atraumatic. Bilateral external ears normal. Oropharynx moist. Nose normal. Neck- Normal range of motion with no guarding, No midline cervical tenderness, Supple, No stridor.   Eyes:  PERRL, EOMI with no signs of entrapment, Conjunctiva normal, No discharge.   Respiratory:  Normal breath sounds, No respiratory distress, No wheezing.    Cardiovascular:  Normal heart rate, Normal rhythm, No appreciable rubs or gallops.   GI:  Soft, No distension, No palpable masses. Moderate LUQ tenderness and marked left CVA tenderness.  Musculoskeletal:  Intact distal pulses, No edema. Good range of motion in all major joints. No tenderness to palpation or major deformities noted.  Integument:  Warm, Dry, No erythema, No rash.   Neurologic:  Alert & oriented, Normal motor function, Normal sensory function, No focal deficits noted.   Psychiatric:  Affect normal, Judgment normal, Mood normal.     LAB:  All pertinent labs reviewed and interpreted.  Results for orders placed or performed during the hospital encounter of 08/01/24   Comprehensive metabolic panel   Result Value Ref Range    Sodium 140 135 - 145 mmol/L    Potassium 4.8 3.4 - 5.3 mmol/L    Carbon Dioxide (CO2) 29 22 - 29 mmol/L    Anion Gap 8 7 - 15 mmol/L    Urea Nitrogen 11.4 6.0 - 20.0 mg/dL    " Creatinine 0.72 0.51 - 0.95 mg/dL    GFR Estimate >90 >60 mL/min/1.73m2    Calcium 9.4 8.8 - 10.4 mg/dL    Chloride 103 98 - 107 mmol/L    Glucose 97 70 - 99 mg/dL    Alkaline Phosphatase 80 40 - 150 U/L    AST 16 0 - 45 U/L    ALT 14 0 - 50 U/L    Protein Total 6.7 6.4 - 8.3 g/dL    Albumin 3.9 3.5 - 5.2 g/dL    Bilirubin Total 0.3 <=1.2 mg/dL   CBC with platelets and differential   Result Value Ref Range    WBC Count 15.5 (H) 4.0 - 11.0 10e3/uL    RBC Count 4.56 3.80 - 5.20 10e6/uL    Hemoglobin 14.7 11.7 - 15.7 g/dL    Hematocrit 42.9 35.0 - 47.0 %    MCV 94 78 - 100 fL    MCH 32.2 26.5 - 33.0 pg    MCHC 34.3 31.5 - 36.5 g/dL    RDW 14.2 10.0 - 15.0 %    Platelet Count 448 150 - 450 10e3/uL    % Neutrophils 68 %    % Lymphocytes 21 %    % Monocytes 8 %    % Eosinophils 2 %    % Basophils 1 %    % Immature Granulocytes 1 %    NRBCs per 100 WBC 0 <1 /100    Absolute Neutrophils 10.5 (H) 1.6 - 8.3 10e3/uL    Absolute Lymphocytes 3.3 0.8 - 5.3 10e3/uL    Absolute Monocytes 1.3 0.0 - 1.3 10e3/uL    Absolute Eosinophils 0.3 0.0 - 0.7 10e3/uL    Absolute Basophils 0.1 0.0 - 0.2 10e3/uL    Absolute Immature Granulocytes 0.1 <=0.4 10e3/uL    Absolute NRBCs 0.0 10e3/uL       RADIOLOGY:  Reviewed all pertinent imaging. Please see official radiology report.  CT Abdomen Pelvis w Contrast    Result Date: 8/1/2024  EXAM: CT ABDOMEN PELVIS W CONTRAST LOCATION: [facility] DATE: 8/1/2024 INDICATION: Sudden left flank pain with removal of stent today COMPARISON: 7/19/2024 TECHNIQUE: CT scan of the abdomen and pelvis was performed following injection of IV contrast. Multiplanar reformats were obtained. Dose reduction techniques were used. CONTRAST: ISOVUE 370 90mL FINDINGS: LOWER CHEST: Normal. HEPATOBILIARY: No significant mass or bile duct dilatation. No calcified gallstones. Unchanged simple cysts, which do not require additional follow-up. PANCREAS: Normal. SPLEEN: Normal. ADRENAL GLANDS: Normal.  KIDNEYS/BLADDER: Moderate left renal collecting system dilation with periureteral fat stranding, delayed nephrogram, and urothelial enhancement. There is abrupt narrowing in the mid left ureter (5/52), but no radiopaque calculi. Localized high attenuation/enhancement also noted at the left ureterovesical junction. BOWEL: Normal. LYMPH NODES: Normal. VASCULATURE: Minimal atherosclerotic calcification of the common iliac arteries. No aneurysm. PELVIC ORGANS: Normal. MUSCULOSKELETAL: Mild degenerative changes in the spine.     IMPRESSION: Left renal obstruction with moderate hydronephrosis and abrupt narrowing/caliber change of the mid left ureter at a similar location as the previously obstructing stone, which could be secondary to localized inflammation or inflammatory stricture. High attenuation or enhancement at the left ureterovesical junction suggesting localized inflammation or blood products. No obstructing ureteral calculi.        I, Adams Diaz, am serving as a scribe to document services personally performed by Ignacio Basurto MD, based on my observation and the provider's statements to me. I, Ignacio Basurto MD attest that Adams Diaz is acting in a scribe capacity, has observed my performance of the services and has documented them in accordance with my direction.    Ignacio Basurto M.D.  Emergency Medicine  [facility] EMERGENCY ROOM     Ignacio Basurto MD  08/01/24 [id number]

## 2024-08-01 NOTE — H&P
"[facility]    History and Physical - Hospitalist Service       Date of Admission:  8/1/2024    Assessment & Plan      Isha Valderrama is a 49 year old female with PMHx significant for previous renal calculi s/p URS (2014), asthma, GERD, anxiety, depression, obesity who was admitted on 8/1/2024 for pain management and Urology consultation.     ED Course: Hypertensive on arrival with /106, otherwise hemodynamically stable, afebrile. Lab workup is remarkable for leukocytosis with WBC 15.5, remainder of CBC and CMP unremarkable.  UA with hematuria, possibly suggestive of infection with elevated WBC and leukocyte esterase.  Urine sent for culture. CT A/P demonstrated \"Left renal obstruction with moderate hydronephrosis and abrupt narrowing/caliber change of the mid left ureter at a similar location as the previously obstructing stone, which could be secondary to localized inflammation or inflammatory stricture. High attenuation or enhancement at the left ureterovesical junction suggesting localized inflammation or blood products. No obstructing ureteral calculi.\" Given 15 mg IV Toradol and 10 mg PO oxycodone without pain relief, then 1 g IV Dilaudid with significant relief; 4 mg IV Zofran; and 1 L IV normal saline bolus. ED Provider discussed with Urology, who recommended admission for pain control and they will see patient tomorrow. Will keep NPO for now in case stent replacement is indicated.      Renal colic, left-sided  Left renal obstruction with moderate hydronephrosis  History of recurrent renal calculi  Recently s/p cystoscopy with left ureteral stent placement (7/29/2024)  Presents with severe left flank pain and cramping along with nausea and vomiting following stent removal in the morning a few hours prior to presentation. UA with hematuria along with possible infection. CT A/P reveals left renal obstruction with moderate hydronephrosis, possible inflammatory stricture at " "location of previous stone, no obstructing renal calculi.  After receiving IV Dilaudid, patient now appears comfortable and notes that pain is tolerable.  - Pain control with PRN Tylenol, PRN oxycodone PO, and PRN IV Dilaudid  - Nausea control with PRN Zofran  - Resume home Detrol PRN for bladder spasms  - Resume home antibiotic course of Keflex 500 mg PO BID (started 7/29 post-operatively)  - Continuous IV NS at 125 mL/hr  - Urology consult  - Keep NPO for now  - Follow urine culture  - AM labs: CBC, CMP    Hypertension  Blood pressure significantly elevated in ED, likely secondary to severe pain. Improved to systolic 130s following IV Dilaudid administration and report of pain relief per patient.  No history of chronic hypertension.  - Monitor blood pressure    Anxiety  Depression  - Resume home Cymbalta    Asthma  Not in acute exacerbation.  - Resume home PRN albuterol inhaler    Severe obesity  BMI 41.    Tobacco use disorder  - Nicotine replacement ordered       Observation Goals: -diagnostic tests and consults completed and resulted, -vital signs normal or at patient baseline, -tolerating oral intake to maintain hydration, -adequate pain control on oral analgesics, -returns to baseline functional status, Nurse to notify provider when observation goals have been met and patient is ready for discharge.  Diet: NPO per Anesthesia Guidelines for Procedure/Surgery Except for: Meds, Ice Chips  DVT Prophylaxis: Pneumatic Compression Devices  Maldonado Catheter: Not present  Lines: None     Cardiac Monitoring: None  Code Status: Full Code    Clinically Significant Risk Factors Present on Admission                          # Severe Obesity: Estimated body mass index is 41.63 kg/m  as calculated from the following:    Height as of this encounter: 1.6 m (5' 3\").    Weight as of this encounter: 106.6 kg (235 lb).       # Asthma: noted on problem list        Disposition Plan     Medically Ready for Discharge: Anticipated " "Tomorrow         The patient's care was discussed with the Attending Physician, Dr. Hany Stokes .    Staci Villa PA-C  Hospitalist Service  [facility]  Securely message with [employer] (more info)  Text page via [facility] Paging/Directory     ______________________________________________________________________    Chief Complaint   Left flank pain    History is obtained from the patient    History of Present Illness   Isha Valderrama is a 49 year old female with PMHx significant for previous renal calculi s/p URS (2014), asthma, GERD, anxiety, obesity who presented to the ED for evaluation of severe left flank pain following stent removal.    Patient was instructed to pull her stent out today. Prior to stent removal, patient reports that pain was manageable with oxycodone as well as alternating Tylenol and ibuprofen. She pre-medicated with oxycodone, Tylenol, and Detrol per instructions from Urology RN. She pulled the stent around 10:00am this morning and initially felt okay. About an hour following stent removal, she developed severe pain and cramping in the left pain, stating \"it feels like I'm in labor.\" She has also had nausea and vomiting x 2 and chills. She is able to urinate without difficulty but her urine is quite bloody. She reports long history of kidney stones and has underwent stent removal an estimated 5 times, none of which have resulted in severe pain like this. Patient also reports that she is currently on her period, which she has not had in about 5 months and thought she was in menopause.    In the ED, patient denied any relief from Toradol or oxycodone PO. She now reports that pain is much improved after receiving a dose of IV Dilaudid. She also denies nausea currently.      Per chart review:  - Patient seen in [facility] ED on 7/19/2024 for evaluation of left flank pain. CT A/P revealed \"6.5 mm stone in the proximal left ureter with mild left-sided hydronephrosis. " "There are a few additional small stones in the left kidney ranging in size from 1 to 2 mm. A few tiny punctate nonobstructing stones in the right kidney.\" UA negative for infection. ED Provider discussed with [facility], who recommended outpatient follow-up. Patient discharged home with tamsulosin, PRN oxycodone, and dimenhydrinate.   - Virtual visit with Urology on 7/22/2024, patient elected to proceed with left ureteroscopy, scheduled for 7/29/2024.   - Patient seen again in [facility] ED on 7/26/2024 for breakthrough pain and requesting refill of oxycodone, which she had been taking consistently q4h since last ED visit. Discharged with limited refill of oxycodone.   - Underwent cystoscopy, left ureteroscopy, left retrograde pyelogram, left ureteral stone manipulation with laser lithotripsy and basket removal, and left ureteral stent placement on 7/29/2024, performed by Dr. Rc Marquis ([facility]). EBL 2 ml. Prescribed Keflex 500 mg PO BID x 7 days. Plan for stent removal 8/1/2024.      Past Medical History    Past Medical History:   Diagnosis Date    Anxiety     Gastroesophageal reflux disease     Kidney stone     Motion sickness     Obese     Uncomplicated asthma        Past Surgical History   Past Surgical History:   Procedure Laterality Date    COMBINED CYSTOSCOPY, RETROGRADES, URETEROSCOPY, LASER HOLMIUM LITHOTRIPSY URETER(S), INSERT STENT Left 07/29/2024    Procedure: Cystoscopy, Left Ureteroscopy, Left Retrograde Pyelogram, Left Laser Lithotripsy, Basket Stone Removal, Left Ureteral Stent Placement;  Surgeon: Rc Marquis MD;  Location: [facility] OR    KIDNEY STONE SURGERY      Kidney Stone [place] (Dr. Ku)    KNEE SURGERY      ORTHOPEDIC SURGERY      OTHER SURGICAL HISTORY      bilateral ureter stents    TONSILLECTOMY         Prior to Admission Medications   Prior to Admission Medications   Prescriptions Last Dose Informant Patient Reported? Taking?   DULoxetine (CYMBALTA) 30 MG " capsule 8/1/2024 at AM  Yes Yes   Sig: Take 1 Capsule (30 mg) by mouth daily. Take with 60mg capsule to equal 90mg*   DULoxetine (CYMBALTA) 60 MG capsule 8/1/2024 at AM  Yes Yes   Sig: Take 60 mg by mouth daily Taken with 30 mg capsule   Fluticasone Propionate, Inhal, (FLOVENT IN) More than a month at PRN  Yes Yes   Sig: Inhale 1 puff into the lungs daily as needed Formulation not known   LORazepam (ATIVAN) 0.5 MG tablet More than a month at PRN dental  Yes Yes   Sig: Take 0.5-1 mg by mouth once as needed for anxiety (use 30 min prior to dental appointmnet)   acetaminophen (TYLENOL) 500 MG tablet 8/1/2024 at AM; 1,000 (likely over 4g/day)  Yes Yes   Sig: Take 500-1,000 mg by mouth every 6 hours as needed for mild pain (do not exceed more than 4,000 mg in 24 hours)   albuterol (PROAIR HFA/PROVENTIL HFA/VENTOLIN HFA) 108 (90 Base) MCG/ACT inhaler 8/1/2024 at AM; has with  Yes Yes   Sig: Inhale 1-2 puffs into the lungs every 6 hours as needed for shortness of breath, wheezing or cough   albuterol (PROVENTIL) (2.5 MG/3ML) 0.083% neb solution More than a month at PRN  Yes Yes   Sig: Take 2.5 mg by nebulization every 6 hours as needed   cephALEXin (KEFLEX) 500 MG capsule 8/1/2024 at AM; 1 of 2  No Yes   Sig: Take 1 capsule (500 mg) by mouth 2 times daily for 7 days   ibuprofen (ADVIL/MOTRIN) 200 MG tablet 8/1/2024 at AM; reports over 2,500 mg in last 24 hours  No Yes   Sig: Take 3 tablets (600 mg) by mouth every 6 hours as needed for mild pain   tolterodine ER (DETROL LA) 2 MG 24 hr capsule 8/1/2024 at AM  No Yes   Sig: Take 1 capsule (2 mg) by mouth daily as needed (bladder spasms/urinary urgency)      Facility-Administered Medications: None        Review of Systems    The 10 point Review of Systems is negative other than noted in the HPI or here.     Social History   I have reviewed this patient's social history and updated it with pertinent information if needed.  Social History     Tobacco Use    Smoking status: Every  Day     Current packs/day: 1.00     Types: Cigarettes   Substance Use Topics    Alcohol use: No    Drug use: No         Allergies   Allergies   Allergen Reactions    Alprazolam Other (See Comments)     Like a zombie   Felt like a Zombi  Like a zombie   Felt like a Zombi      Compazine [Prochlorperazine] Other (See Comments)     Dystonic reaction    Morphine Itching        Physical Exam   Vital Signs: Temp: 98  F (36.7  C) Temp src: Temporal BP: 115/62 Pulse: 58   Resp: 20 SpO2: 92 % O2 Device: None (Room air)    Weight: 235 lbs 0 oz    General Appearance: Awake, alert, in no acute distress.  Respiratory: Clear to auscultation bilaterally. Normal respiratory effort on room air.  Cardiovascular: Regular rate and rhythm, no murmur. Extremities are warm and well-perfused.  GI: Soft, non-distended. Tender to palpation over the left side of abdomen. Normal bowel sounds. Marked CVA tenderness on left side.  Skin: No obvious rashes or lesions on observed skin.  Musculoskeletal: Able to move all extremities freely. No lower extremity edema.  Neurologic: Alert and oriented x 3. Strength and sensation are grossly equal and intact in bilateral upper and lower extremities.  Psychiatric: Calm, pleasant, cooperative with exam.      Medical Decision Making       80 MINUTES SPENT BY ME on the date of service doing chart review, history, exam, documentation & further activities per the note.      Data     I have personally reviewed the following data over the past 24 hrs:    15.5 (H)  \   14.7   / 448     140 103 11.4 /  97   4.8 29 0.72 \     ALT: 14 AST: 16 AP: 80 TBILI: 0.3   ALB: 3.9 TOT PROTEIN: 6.7 LIPASE: N/A       Imaging results reviewed over the past 24 hrs:   Recent Results (from the past 24 hour(s))   CT Abdomen Pelvis w Contrast    Narrative    EXAM: CT ABDOMEN PELVIS W CONTRAST  LOCATION: [facility]  DATE: 8/1/2024    INDICATION: Sudden left flank pain with removal of stent today  COMPARISON:  7/19/2024  TECHNIQUE: CT scan of the abdomen and pelvis was performed following injection of IV contrast. Multiplanar reformats were obtained. Dose reduction techniques were used.  CONTRAST: ISOVUE 370 90mL    FINDINGS:   LOWER CHEST: Normal.    HEPATOBILIARY: No significant mass or bile duct dilatation. No calcified gallstones. Unchanged simple cysts, which do not require additional follow-up.    PANCREAS: Normal.    SPLEEN: Normal.    ADRENAL GLANDS: Normal.    KIDNEYS/BLADDER: Moderate left renal collecting system dilation with periureteral fat stranding, delayed nephrogram, and urothelial enhancement. There is abrupt narrowing in the mid left ureter (5/52), but no radiopaque calculi. Localized high   attenuation/enhancement also noted at the left ureterovesical junction.    BOWEL: Normal.    LYMPH NODES: Normal.    VASCULATURE: Minimal atherosclerotic calcification of the common iliac arteries. No aneurysm.    PELVIC ORGANS: Normal.    MUSCULOSKELETAL: Mild degenerative changes in the spine.      Impression    IMPRESSION:   Left renal obstruction with moderate hydronephrosis and abrupt narrowing/caliber change of the mid left ureter at a similar location as the previously obstructing stone, which could be secondary to localized inflammation or inflammatory stricture. High   attenuation or enhancement at the left ureterovesical junction suggesting localized inflammation or blood products. No obstructing ureteral calculi.

## 2024-08-02 VITALS
HEIGHT: 63 IN | HEART RATE: 59 BPM | SYSTOLIC BLOOD PRESSURE: 113 MMHG | OXYGEN SATURATION: 94 % | WEIGHT: 235 LBS | TEMPERATURE: 98.8 F | DIASTOLIC BLOOD PRESSURE: 65 MMHG | RESPIRATION RATE: 18 BRPM | BODY MASS INDEX: 41.64 KG/M2

## 2024-08-02 LAB
ALBUMIN SERPL BCG-MCNC: 3.1 G/DL (ref 3.5–5.2)
ALBUMIN UR-MCNC: 20 MG/DL
ALP SERPL-CCNC: 65 U/L (ref 40–150)
ALT SERPL W P-5'-P-CCNC: 8 U/L (ref 0–50)
ANION GAP SERPL CALCULATED.3IONS-SCNC: 6 MMOL/L (ref 7–15)
APPEARANCE UR: ABNORMAL
AST SERPL W P-5'-P-CCNC: 15 U/L (ref 0–45)
BACTERIA #/AREA URNS HPF: ABNORMAL /HPF
BACTERIA UR CULT: NO GROWTH
BILIRUB SERPL-MCNC: 0.3 MG/DL
BILIRUB UR QL STRIP: NEGATIVE
BUN SERPL-MCNC: 7.7 MG/DL (ref 6–20)
CALCIUM SERPL-MCNC: 8.4 MG/DL (ref 8.8–10.4)
CHLORIDE SERPL-SCNC: 108 MMOL/L (ref 98–107)
COLOR UR AUTO: ABNORMAL
CREAT SERPL-MCNC: 0.59 MG/DL (ref 0.51–0.95)
EGFRCR SERPLBLD CKD-EPI 2021: >90 ML/MIN/1.73M2
ERYTHROCYTE [DISTWIDTH] IN BLOOD BY AUTOMATED COUNT: 14.3 % (ref 10–15)
GLUCOSE SERPL-MCNC: 87 MG/DL (ref 70–99)
GLUCOSE UR STRIP-MCNC: NEGATIVE MG/DL
HCO3 SERPL-SCNC: 27 MMOL/L (ref 22–29)
HCT VFR BLD AUTO: 35.6 % (ref 35–47)
HGB BLD-MCNC: 12.1 G/DL (ref 11.7–15.7)
HGB UR QL STRIP: ABNORMAL
KETONES UR STRIP-MCNC: NEGATIVE MG/DL
LEUKOCYTE ESTERASE UR QL STRIP: ABNORMAL
MCH RBC QN AUTO: 31.8 PG (ref 26.5–33)
MCHC RBC AUTO-ENTMCNC: 34 G/DL (ref 31.5–36.5)
MCV RBC AUTO: 93 FL (ref 78–100)
MUCOUS THREADS #/AREA URNS LPF: PRESENT /LPF
NITRATE UR QL: NEGATIVE
PH UR STRIP: 6.5 [PH] (ref 5–7)
PLATELET # BLD AUTO: 365 10E3/UL (ref 150–450)
POTASSIUM SERPL-SCNC: 4 MMOL/L (ref 3.4–5.3)
PROT SERPL-MCNC: 5.3 G/DL (ref 6.4–8.3)
RBC # BLD AUTO: 3.81 10E6/UL (ref 3.8–5.2)
RBC URINE: >182 /HPF
SODIUM SERPL-SCNC: 141 MMOL/L (ref 135–145)
SP GR UR STRIP: 1.01 (ref 1–1.03)
SQUAMOUS EPITHELIAL: 10 /HPF
UROBILINOGEN UR STRIP-MCNC: <2 MG/DL
WBC # BLD AUTO: 11.5 10E3/UL (ref 4–11)
WBC URINE: 132 /HPF

## 2024-08-02 PROCEDURE — 250N000013 HC RX MED GY IP 250 OP 250 PS 637: Performed by: FAMILY MEDICINE

## 2024-08-02 PROCEDURE — 250N000011 HC RX IP 250 OP 636

## 2024-08-02 PROCEDURE — 96361 HYDRATE IV INFUSION ADD-ON: CPT

## 2024-08-02 PROCEDURE — 250N000013 HC RX MED GY IP 250 OP 250 PS 637

## 2024-08-02 PROCEDURE — 80053 COMPREHEN METABOLIC PANEL: CPT

## 2024-08-02 PROCEDURE — G0378 HOSPITAL OBSERVATION PER HR: HCPCS

## 2024-08-02 PROCEDURE — 87086 URINE CULTURE/COLONY COUNT: CPT | Performed by: FAMILY MEDICINE

## 2024-08-02 PROCEDURE — 36415 COLL VENOUS BLD VENIPUNCTURE: CPT

## 2024-08-02 PROCEDURE — 258N000003 HC RX IP 258 OP 636

## 2024-08-02 PROCEDURE — 96376 TX/PRO/DX INJ SAME DRUG ADON: CPT

## 2024-08-02 PROCEDURE — 85027 COMPLETE CBC AUTOMATED: CPT

## 2024-08-02 PROCEDURE — 99221 1ST HOSP IP/OBS SF/LOW 40: CPT | Performed by: UROLOGY

## 2024-08-02 PROCEDURE — 99239 HOSP IP/OBS DSCHRG MGMT >30: CPT | Performed by: FAMILY MEDICINE

## 2024-08-02 PROCEDURE — 81001 URINALYSIS AUTO W/SCOPE: CPT | Performed by: FAMILY MEDICINE

## 2024-08-02 PROCEDURE — 250N000011 HC RX IP 250 OP 636: Performed by: STUDENT IN AN ORGANIZED HEALTH CARE EDUCATION/TRAINING PROGRAM

## 2024-08-02 RX ORDER — HYDROMORPHONE HYDROCHLORIDE 2 MG/1
2 TABLET ORAL EVERY 6 HOURS PRN
Qty: 10 TABLET | Refills: 0 | Status: SHIPPED | OUTPATIENT
Start: 2024-08-02 | End: 2024-08-05

## 2024-08-02 RX ORDER — NICOTINE 21 MG/24HR
1 PATCH, TRANSDERMAL 24 HOURS TRANSDERMAL DAILY
Qty: 28 PATCH | Refills: 0 | Status: SHIPPED | OUTPATIENT
Start: 2024-08-03

## 2024-08-02 RX ORDER — HYDROMORPHONE HYDROCHLORIDE 2 MG/1
2 TABLET ORAL
Status: DISCONTINUED | OUTPATIENT
Start: 2024-08-02 | End: 2024-08-02 | Stop reason: HOSPADM

## 2024-08-02 RX ORDER — TAMSULOSIN HYDROCHLORIDE 0.4 MG/1
0.4 CAPSULE ORAL DAILY
Status: DISCONTINUED | OUTPATIENT
Start: 2024-08-02 | End: 2024-08-02 | Stop reason: HOSPADM

## 2024-08-02 RX ORDER — HYDROMORPHONE HYDROCHLORIDE 2 MG/1
2 TABLET ORAL ONCE
Status: COMPLETED | OUTPATIENT
Start: 2024-08-02 | End: 2024-08-02

## 2024-08-02 RX ORDER — NALOXONE HYDROCHLORIDE 0.4 MG/ML
0.2 INJECTION, SOLUTION INTRAMUSCULAR; INTRAVENOUS; SUBCUTANEOUS
Status: DISCONTINUED | OUTPATIENT
Start: 2024-08-02 | End: 2024-08-02 | Stop reason: HOSPADM

## 2024-08-02 RX ORDER — NALOXONE HYDROCHLORIDE 0.4 MG/ML
0.4 INJECTION, SOLUTION INTRAMUSCULAR; INTRAVENOUS; SUBCUTANEOUS
Status: DISCONTINUED | OUTPATIENT
Start: 2024-08-02 | End: 2024-08-02 | Stop reason: HOSPADM

## 2024-08-02 RX ORDER — HYDRALAZINE HYDROCHLORIDE 20 MG/ML
10 INJECTION INTRAMUSCULAR; INTRAVENOUS EVERY 6 HOURS PRN
Status: DISCONTINUED | OUTPATIENT
Start: 2024-08-02 | End: 2024-08-02 | Stop reason: HOSPADM

## 2024-08-02 RX ORDER — KETOROLAC TROMETHAMINE 15 MG/ML
15 INJECTION, SOLUTION INTRAMUSCULAR; INTRAVENOUS EVERY 6 HOURS
Status: DISCONTINUED | OUTPATIENT
Start: 2024-08-02 | End: 2024-08-02 | Stop reason: HOSPADM

## 2024-08-02 RX ORDER — ONDANSETRON 4 MG/1
4 TABLET, ORALLY DISINTEGRATING ORAL EVERY 6 HOURS PRN
Qty: 10 TABLET | Refills: 0 | Status: SHIPPED | OUTPATIENT
Start: 2024-08-02

## 2024-08-02 RX ORDER — TAMSULOSIN HYDROCHLORIDE 0.4 MG/1
0.4 CAPSULE ORAL DAILY
Qty: 15 CAPSULE | Refills: 0 | Status: SHIPPED | OUTPATIENT
Start: 2024-08-02

## 2024-08-02 RX ADMIN — OXYCODONE 5 MG: 5 TABLET ORAL at 05:19

## 2024-08-02 RX ADMIN — OXYCODONE 5 MG: 5 TABLET ORAL at 10:52

## 2024-08-02 RX ADMIN — KETOROLAC TROMETHAMINE 15 MG: 15 INJECTION, SOLUTION INTRAMUSCULAR; INTRAVENOUS at 13:08

## 2024-08-02 RX ADMIN — NICOTINE 1 PATCH: 21 PATCH, EXTENDED RELEASE TRANSDERMAL at 08:15

## 2024-08-02 RX ADMIN — ONDANSETRON 4 MG: 2 INJECTION INTRAMUSCULAR; INTRAVENOUS at 05:16

## 2024-08-02 RX ADMIN — KETOROLAC TROMETHAMINE 15 MG: 15 INJECTION, SOLUTION INTRAMUSCULAR; INTRAVENOUS at 08:19

## 2024-08-02 RX ADMIN — ACETAMINOPHEN 650 MG: 325 TABLET ORAL at 10:52

## 2024-08-02 RX ADMIN — ACETAMINOPHEN 650 MG: 325 TABLET ORAL at 05:20

## 2024-08-02 RX ADMIN — DULOXETINE HYDROCHLORIDE 30 MG: 30 CAPSULE, DELAYED RELEASE ORAL at 08:30

## 2024-08-02 RX ADMIN — CEPHALEXIN 500 MG: 500 CAPSULE ORAL at 08:19

## 2024-08-02 RX ADMIN — OXYCODONE 5 MG: 5 TABLET ORAL at 00:54

## 2024-08-02 RX ADMIN — SODIUM CHLORIDE: 9 INJECTION, SOLUTION INTRAVENOUS at 02:54

## 2024-08-02 RX ADMIN — TOLTERODINE TARTRATE 2 MG: 2 CAPSULE, EXTENDED RELEASE ORAL at 02:12

## 2024-08-02 RX ADMIN — HYDROMORPHONE HYDROCHLORIDE 0.4 MG: 0.2 INJECTION, SOLUTION INTRAMUSCULAR; INTRAVENOUS; SUBCUTANEOUS at 08:19

## 2024-08-02 RX ADMIN — HYDROMORPHONE HYDROCHLORIDE 0.4 MG: 0.2 INJECTION, SOLUTION INTRAMUSCULAR; INTRAVENOUS; SUBCUTANEOUS at 02:52

## 2024-08-02 RX ADMIN — DULOXETINE HYDROCHLORIDE 60 MG: 30 CAPSULE, DELAYED RELEASE ORAL at 08:30

## 2024-08-02 RX ADMIN — HYDROMORPHONE HYDROCHLORIDE 2 MG: 2 TABLET ORAL at 13:07

## 2024-08-02 ASSESSMENT — ACTIVITIES OF DAILY LIVING (ADL)
ADLS_ACUITY_SCORE: 35

## 2024-08-02 NOTE — UTILIZATION REVIEW
Admission Status; Secondary Review Determination   Under the authority of the Utilization Management Committee, the utilization review process indicated a secondary review on the above patient. The review outcome is based on review of the medical records, discussions with staff, and applying clinical experience noted on the date of the review.   (x) Inpatient Status Appropriate - This patient's medical care is consistent with medical management for inpatient care and reasonable inpatient medical practice.   RATIONALE FOR DETERMINATION   48 yo female with h/o recent renal calculi with stent placement that presents to the ED with severe left flank pain, N/V two hours after outpt stent removal.  CT reveals mod hydronephrosis and possible inflammatory stricture at level of previous stone but no obstructing calculi.  Continues on IVF and is requiring both IV and po medications for pain.  Urology consulted; added scheduled IV toradol today, continue IVF and anticipate may need replacement of ureteral stent in the am.   At the time of admission with the information available to the attending physician more than 2 nights Hospital complex care was anticipated, based on patient risk of adverse outcome if treated as outpatient and complex care required. Inpatient admission is appropriate based on the Medicare guidelines.   The information on this document is developed by the utilization review team in order for the business office to ensure compliance. This only denotes the appropriateness of proper admission status and does not reflect the quality of care rendered.   The definitions of Inpatient Status and Observation Status used in making the determination above are those provided in the CMS Coverage Manual, Chapter 1 and Chapter 6, section 70.4.     Sincerely,     Ching Damon, DO  Utilization Review  Physician Advisor

## 2024-08-02 NOTE — CONSULTS
UROLOGY CONSULT     Chief Complaint:   Flank pain      Synopsis    Isha Valderrama is a very pleasant AGE: 49 year old year old person    She has a history of recent left-sided ureteroscopy with a colleague of mine.  This was performed Monday.  She had a stent on a string which she removed at home yesterday afternoon.  Her pain after stent removal became unmanageable and she came to the emergency room overnight.  A CT scan was performed showing left-sided hydroureteronephrosis without evidence of obstructing stone.  On my personal review of imaging there could perhaps be a clot or some periureteral inflammation in the mid ureter and distal ureter.  Over the course of the morning she has been doing generally better.  She has not required any IV narcotic pain medication and is utilizing only oral oxycodone and Toradol for this past shift.  She is very hungry and at the time of the visit around 1:00 was about to have lunch.  She denies any nausea or vomiting at the moment.  Labs were reviewed this morning showing normal creatinine and white blood cell count.  Her urine was collected for analysis and is at the bedside that is clear pink.  She denies fevers, chills.  She has been afebrile since admission.         Medications     Current Facility-Administered Medications   Medication Dose Route Frequency Provider Last Rate Last Admin    acetaminophen (TYLENOL) tablet 650 mg  650 mg Oral Q4H PRN Staci Villa PA-C   650 mg at 08/02/24 1052    Or    acetaminophen (TYLENOL) Suppository 650 mg  650 mg Rectal Q4H PRN Staci Villa PA-C        albuterol (PROVENTIL HFA/VENTOLIN HFA) inhaler  1-2 puff Inhalation Q6H PRN Staci Villa PA-C        cephALEXin (KEFLEX) capsule 500 mg  500 mg Oral BID Staci Villa PA-C   500 mg at 08/02/24 0819    DULoxetine (CYMBALTA) DR capsule 30 mg  30 mg Oral Daily Staci Villa PA-C   30 mg at 08/02/24 0830    DULoxetine (CYMBALTA) DR capsule 60 mg  60 mg Oral Daily Staci Villa  ARI   60 mg at 08/02/24 0830    hydrALAZINE (APRESOLINE) injection 10 mg  10 mg Intravenous Q6H PRN Hany Stokes MD        HYDROmorphone (DILAUDID) injection 0.2 mg  0.2 mg Intravenous Q2H PRN Staci Villa PA-C        HYDROmorphone (DILAUDID) injection 0.4 mg  0.4 mg Intravenous Q2H PRN Staci Villa PA-C   0.4 mg at 08/02/24 0819    HYDROmorphone (DILAUDID) tablet 2 mg  2 mg Oral Q3H PRN Hany Stokes MD        ketorolac (TORADOL) injection 15 mg  15 mg Intravenous Q6H Juan Alberto Erazo MD   15 mg at 08/02/24 1308    naloxone (NARCAN) injection 0.2 mg  0.2 mg Intravenous Q2 Min PRN Hany Stokes MD        Or    naloxone (NARCAN) injection 0.4 mg  0.4 mg Intravenous Q2 Min PRN Hany Stokes MD        Or    naloxone (NARCAN) injection 0.2 mg  0.2 mg Intramuscular Q2 Min PRN Hany Stokes MD        Or    naloxone (NARCAN) injection 0.4 mg  0.4 mg Intramuscular Q2 Min PRN Hany Stokes MD        nicotine (NICODERM CQ) 21 MG/24HR 24 hr patch 1 patch  1 patch Transdermal Daily Hany Stokes MD   1 patch at 08/02/24 0815    ondansetron (ZOFRAN ODT) ODT tab 4 mg  4 mg Oral Q6H PRN Staci Villa PA-C        Or    ondansetron (ZOFRAN) injection 4 mg  4 mg Intravenous Q6H PRN Staci Villa PA-C   4 mg at 08/02/24 0516    oxyCODONE (ROXICODONE) tablet 5 mg  5 mg Oral Q4H PRN Staci Villa PA-C   5 mg at 08/02/24 1052    oxyCODONE IR (ROXICODONE) half-tab 2.5 mg  2.5 mg Oral Q4H PRN Staci Villa PA-C        senna-docusate (SENOKOT-S/PERICOLACE) 8.6-50 MG per tablet 1 tablet  1 tablet Oral BID PRN Staci Villa PA-C        Or    senna-docusate (SENOKOT-S/PERICOLACE) 8.6-50 MG per tablet 2 tablet  2 tablet Oral BID PRN Staci Villa PA-C        sodium chloride 0.9 % infusion   Intravenous Continuous Staci Villa PA-C   Stopped at 08/02/24 1054    tamsulosin (FLOMAX) capsule 0.4 mg  0.4 mg Oral Daily Mohan Marquez MD        tolterodine ER (DETROL LA) 24 hr capsule 2 mg  2 mg  Oral Daily PRN Staci Villa PA-C   2 mg at 08/02/24 0212         The following  distinct labs were reviewed    I personally reviewed all applicable laboratory data and went over findings with patient  Significant for:    CBC RESULTS:  Recent Labs   Lab Test 08/02/24  0611 08/01/24  1259 10/02/23  0622 06/14/23  1440   WBC 11.5* 15.5* 15.6* 13.0*   HGB 12.1 14.7 13.6 15.3    448 414 445        BMP RESULTS:  Recent Labs   Lab Test 08/02/24  0611 08/01/24  1259 10/02/23  0622 06/14/23  1440 06/09/23  1219 11/06/19  1559 03/20/19  1445 02/27/19  1049 01/01/19  1513    140 138 138   < > 141 140 139 141   POTASSIUM 4.0 4.8 4.2 4.3   < > 3.8 4.1 3.9 4.2   CHLORIDE 108* 103 101 102   < > 106 103 106 105   CO2 27 29 29 30   < > 30 29 27 24   ANIONGAP 6* 8 8 6   < > 5 8 6 12   GLC 87 97 103* 99   < > 77 88 84 106   BUN 7.7 11.4 10.6 8   < > 5* 6* 6* 14   CR 0.59 0.72 0.59 0.67   < > 0.63 0.65 0.66 0.71   GFRESTIMATED >90 >90 >90 >90   < > >60 >60 >60 >60   GFRESTBLACK  --   --   --   --   --  >60 >60 >60 >60    < > = values in this interval not displayed.       CALCIUM RESULTS:  Recent Labs   Lab Test 08/02/24  0611 08/01/24  1259 10/02/23  0622 06/14/23  1440   ALMAS 8.4* 9.4 8.6 9.3     UA RESULTS:   Recent Labs   Lab Test 08/02/24  1318 08/01/24  1458 07/19/24  0839   SG 1.013 1.040* 1.025   URINEPH 6.5 6.5 6.5   NITRITE Negative Negative Negative   RBCU >182* >182* 38*   WBCU 132* 92* 1         Recent Imaging Report    I personally reviewed all applicable imaging and went over the below findings with patient.    Results for orders placed or performed during the hospital encounter of 08/01/24   CT Abdomen Pelvis w Contrast    Narrative    EXAM: CT ABDOMEN PELVIS W CONTRAST  LOCATION: [facility]  DATE: 8/1/2024    INDICATION: Sudden left flank pain with removal of stent today  COMPARISON: 7/19/2024  TECHNIQUE: CT scan of the abdomen and pelvis was performed following injection of IV  contrast. Multiplanar reformats were obtained. Dose reduction techniques were used.  CONTRAST: ISOVUE 370 90mL    FINDINGS:   LOWER CHEST: Normal.    HEPATOBILIARY: No significant mass or bile duct dilatation. No calcified gallstones. Unchanged simple cysts, which do not require additional follow-up.    PANCREAS: Normal.    SPLEEN: Normal.    ADRENAL GLANDS: Normal.    KIDNEYS/BLADDER: Moderate left renal collecting system dilation with periureteral fat stranding, delayed nephrogram, and urothelial enhancement. There is abrupt narrowing in the mid left ureter (5/52), but no radiopaque calculi. Localized high   attenuation/enhancement also noted at the left ureterovesical junction.    BOWEL: Normal.    LYMPH NODES: Normal.    VASCULATURE: Minimal atherosclerotic calcification of the common iliac arteries. No aneurysm.    PELVIC ORGANS: Normal.    MUSCULOSKELETAL: Mild degenerative changes in the spine.      Impression    IMPRESSION:   Left renal obstruction with moderate hydronephrosis and abrupt narrowing/caliber change of the mid left ureter at a similar location as the previously obstructing stone, which could be secondary to localized inflammation or inflammatory stricture. High   attenuation or enhancement at the left ureterovesical junction suggesting localized inflammation or blood products. No obstructing ureteral calculi.              Assessment/Plan   49 year old year old person with left renal colic secondary to suspected ureteral edema after procedure and stent removal.  -We discussed that stent colic from ureteral edema after stent removal is fairly common.  In most cases this resolves spontaneously within a day or 2.  Given that she is still moderately uncomfortable and requiring oral narcotic pain medication I would recommend making sure that she is able to tolerate a diet and that pain is improving.  We made shared decision to monitor her pain for the remainder of the shift and if it is generally  improving she can discharge with plan for close follow-up next week with a renal ultrasound.  If her pain is not getting any better or is poorly managed would recommend that she stay overnight for possible left-sided ureteral stent replacement tomorrow.  I would recommend she be n.p.o. at midnight.  I offered her the chance to place a prophylactic stent today but her preference would be to avoid one as she has had bothersome symptoms with stents in the past as well    CC:  Dewayne Barrera

## 2024-08-02 NOTE — PROGRESS NOTES
[facility] MEDICINE  PROGRESS NOTE     Code Status: Full Code       Identification/Summary:   Isha Valderrama is a 49 year old female with PMHx significant for previous renal calculi s/p URS (2014), asthma, GERD, anxiety, depression, obesity.  7/29 underwent cystoscopy with nephrolithiasis removal and ureteral stent placement.  Since that time has been having significant pain.  8/1/2024 removed her stent at home and following this had severe 10 out of 10 left flank pain which did not improve.  Seen in the ED.  Significant findings white count 15, UA with significant hematuria.  CT showed Left renal obstruction with moderate hydronephrosis and abrupt narrowing/caliber change of the mid left ureter at a similar location as the previously obstructing stone, which could be secondary to localized inflammation or inflammatory stricture. High attenuation or enhancement at the left ureterovesical junction suggesting localized inflammation or blood products. No obstructing ureteral calculi.  Urology contacted and recommended symptomatic treatment monitoring overnight.  Kept NPO.  8/2 patient urine has improved in presentation but still complaining of 9 out of 10 left flank pain.  Pain team has been consulted.  Patient frustrated to be staying would like to go home but also still complaining of significant pain.     Assessment and Plan:     Renal colic, left-sided  Left renal obstruction with moderate hydronephrosis  History of recurrent renal calculi  Recently s/p cystoscopy with left ureteral stent placement (7/29/2024)  -UA with hematuria along with possible infection.  Follow urine culture.  -CT A/P reveals left renal obstruction with moderate hydronephrosis, possible inflammatory stricture at location of previous stone, no obstructing renal calculi.  -After receiving IV Dilaudid, patient had temporary improvement in her pain.  -Continue previous Keflex 500 mg twice daily.  -Continue  "IV fluids at 125.  -Appreciate urology consultation.  Recommend concentrating on pain management.  Added Toradol 15 mg IV every 6 hours scheduled.  If unsuccessful may need to go the OR.  -8/2 patient still complaining of 9 out of 10 pain but UA appears improved.  Have ordered oral Dilaudid and asked for pain team consultation.  -Patient very anxious to discharge home but also is still quite uncomfortable.  Willing to stay in the hospital so we can work on her pain control.    Leukocytosis  Initial white count 15.5.  Recheck down to 11.5.     Secondary hypertension  Blood pressure significantly elevated in ED, likely secondary to severe pain. Improved to systolic 130s following IV Dilaudid administration and report of pain relief per patient.  No history of chronic hypertension.  Hydralazine available for severe elevations.     Anxiety  Depression  -Continue home Cymbalta     Asthma  Not in acute exacerbation.  -Continue home PRN albuterol inhaler     Severe obesity  BMI 41.     Tobacco use disorder  - Nicotine replacement utilized    Anticoagulation   Low Risk/Ambulatory with no VTE prophylaxis indicated    COVID-19 PCR not tested    Fluids: NS at 125  Pain meds: Tylenol, oxycodone, Dilaudid  Therapy: N/A  [place]on[place]:Not present  Lines: None       Current Diet  Orders Placed This Encounter      Regular Diet Adult    Supplements  None    Barriers to Discharge: Pain control    Disposition: Possible discharge late today versus 8/3  Medically Ready for Discharge: Anticipated Today       Clinically Significant Risk Factors Present on Admission              # Hypoalbuminemia: Lowest albumin = 3.1 g/dL at 8/2/2024  6:11 AM, will monitor as appropriate             # Severe Obesity: Estimated body mass index is 41.63 kg/m  as calculated from the following:    Height as of this encounter: 1.6 m (5' 3\").    Weight as of this encounter: 106.6 kg (235 lb).       # Asthma: noted on problem list        Interval " History/Subjective:  Patient notes that urine is still quite red but clinically appears improved.  Rates her flank pain 9 out of 10.  Higher up on the back.  No chest pain.  No shortness of breath.  No nausea or vomiting.  Patient frustrated that she is still in the hospital and still having hematuria with some pain.  Is hoping to leave today but also wants to try and get her pain well-controlled.  Questions answered to verbalized satisfaction.      Last 24H PRN:     acetaminophen (TYLENOL) tablet 650 mg, 650 mg at 08/02/24 1052 **OR** acetaminophen (TYLENOL) Suppository 650 mg    HYDROmorphone (DILAUDID) injection 0.4 mg, 0.4 mg at 08/02/24 0819    ondansetron (ZOFRAN ODT) ODT tab 4 mg **OR** ondansetron (ZOFRAN) injection 4 mg, 4 mg at 08/02/24 0516    oxyCODONE (ROXICODONE) tablet 5 mg, 5 mg at 08/02/24 1052    tolterodine ER (DETROL LA) 24 hr capsule 2 mg, 2 mg at 08/02/24 0212    Physical Exam/Objective:  Temp:  [97.4  F (36.3  C)-98.4  F (36.9  C)] 98.1  F (36.7  C)  Pulse:  [54-69] 69  Resp:  [16-20] 20  BP: (111-186)/() 139/79  SpO2:  [91 %-99 %] 94 %  Wt Readings from Last 4 Encounters:   08/01/24 106.6 kg (235 lb)   07/29/24 106.6 kg (235 lb)   07/19/24 113.4 kg (250 lb)   01/17/24 106.6 kg (235 lb)     Body mass index is 41.63 kg/m .    Constitutional: awake, alert, cooperative, no apparent distress, and appears stated age and frustrated  ENT: Normocephalic, without obvious abnormality, atraumatic, external ears without lesions, oral pharynx with moist mucous membranes, tonsils without erythema or exudates, gums normal and good dentition.  Respiratory: No increased work of breathing, good air exchange, clear to auscultation bilaterally, no crackles or wheezing  Cardiovascular: Normal apical impulse, regular rate and rhythm, normal S1 and S2, no S3 or S4, and no murmur noted  GI: No scars, normal bowel sounds, soft, non-distended, non-tender, no masses palpated, no hepatosplenomegally  Skin: normal  skin color, texture, turgor, no redness, warmth, or swelling, and no rashes  Musculoskeletal: There is no redness, warmth, or swelling of the joints.  Motor strength is 5 out of 5 all extremities bilaterally.  Tone is normal. no lower extremity pitting edema present  Neurologic: Cranial nerves II-XII are grossly intact. Sensory:  Sensory intact  Neuropsychiatric: General: aggitated and normal eye contact Level of consciousness: alert / normal Affect: angry Orientation: oriented to self, place, time and situation Memory and insight: normal, memory for past and recent events intact, and thought process normal  Back: Does have left CVA tenderness to palpation    Medications:   Personally Reviewed.  Medications   Current Facility-Administered Medications   Medication Dose Route Frequency Provider Last Rate Last Admin    sodium chloride 0.9 % infusion   Intravenous Continuous Staci Villa PA-C   Stopped at 08/02/24 1054     Current Facility-Administered Medications   Medication Dose Route Frequency Provider Last Rate Last Admin    cephALEXin (KEFLEX) capsule 500 mg  500 mg Oral BID Staci Villa PA-C   500 mg at 08/02/24 0819    DULoxetine (CYMBALTA) DR capsule 30 mg  30 mg Oral Daily Staci Villa PA-C   30 mg at 08/02/24 0830    DULoxetine (CYMBALTA) DR capsule 60 mg  60 mg Oral Daily Staci Villa PA-C   60 mg at 08/02/24 0830    HYDROmorphone (DILAUDID) tablet 2 mg  2 mg Oral Once Hany Stokes MD        ketorolac (TORADOL) injection 15 mg  15 mg Intravenous Q6H Juan Alberto Erazo MD   15 mg at 08/02/24 0819    nicotine (NICODERM CQ) 21 MG/24HR 24 hr patch 1 patch  1 patch Transdermal Daily Hany Stokes MD   1 patch at 08/02/24 0815       Data reviewed today: I personally reviewed all new medications, labs, imaging/diagnostics reports over the past 24 hours. Pertinent findings include:    Imaging:   Recent Results (from the past 24 hour(s))   CT Abdomen Pelvis w Contrast    Narrative    EXAM: CT  ABDOMEN PELVIS W CONTRAST  LOCATION: [facility]  DATE: 8/1/2024    INDICATION: Sudden left flank pain with removal of stent today  COMPARISON: 7/19/2024  TECHNIQUE: CT scan of the abdomen and pelvis was performed following injection of IV contrast. Multiplanar reformats were obtained. Dose reduction techniques were used.  CONTRAST: ISOVUE 370 90mL    FINDINGS:   LOWER CHEST: Normal.    HEPATOBILIARY: No significant mass or bile duct dilatation. No calcified gallstones. Unchanged simple cysts, which do not require additional follow-up.    PANCREAS: Normal.    SPLEEN: Normal.    ADRENAL GLANDS: Normal.    KIDNEYS/BLADDER: Moderate left renal collecting system dilation with periureteral fat stranding, delayed nephrogram, and urothelial enhancement. There is abrupt narrowing in the mid left ureter (5/52), but no radiopaque calculi. Localized high   attenuation/enhancement also noted at the left ureterovesical junction.    BOWEL: Normal.    LYMPH NODES: Normal.    VASCULATURE: Minimal atherosclerotic calcification of the common iliac arteries. No aneurysm.    PELVIC ORGANS: Normal.    MUSCULOSKELETAL: Mild degenerative changes in the spine.      Impression    IMPRESSION:   Left renal obstruction with moderate hydronephrosis and abrupt narrowing/caliber change of the mid left ureter at a similar location as the previously obstructing stone, which could be secondary to localized inflammation or inflammatory stricture. High   attenuation or enhancement at the left ureterovesical junction suggesting localized inflammation or blood products. No obstructing ureteral calculi.     No results found for this or any previous visit (from the past 4320 hour(s)).    Labs:  CT Abdomen Pelvis w Contrast   Final Result   IMPRESSION:    Left renal obstruction with moderate hydronephrosis and abrupt narrowing/caliber change of the mid left ureter at a similar location as the previously obstructing stone, which  could be secondary to localized inflammation or inflammatory stricture. High    attenuation or enhancement at the left ureterovesical junction suggesting localized inflammation or blood products. No obstructing ureteral calculi.        Recent Results (from the past 24 hour(s))   Comprehensive metabolic panel    Collection Time: 08/01/24 12:59 PM   Result Value Ref Range    Sodium 140 135 - 145 mmol/L    Potassium 4.8 3.4 - 5.3 mmol/L    Carbon Dioxide (CO2) 29 22 - 29 mmol/L    Anion Gap 8 7 - 15 mmol/L    Urea Nitrogen 11.4 6.0 - 20.0 mg/dL    Creatinine 0.72 0.51 - 0.95 mg/dL    GFR Estimate >90 >60 mL/min/1.73m2    Calcium 9.4 8.8 - 10.4 mg/dL    Chloride 103 98 - 107 mmol/L    Glucose 97 70 - 99 mg/dL    Alkaline Phosphatase 80 40 - 150 U/L    AST 16 0 - 45 U/L    ALT 14 0 - 50 U/L    Protein Total 6.7 6.4 - 8.3 g/dL    Albumin 3.9 3.5 - 5.2 g/dL    Bilirubin Total 0.3 <=1.2 mg/dL   CBC with platelets and differential    Collection Time: 08/01/24 12:59 PM   Result Value Ref Range    WBC Count 15.5 (H) 4.0 - 11.0 10e3/uL    RBC Count 4.56 3.80 - 5.20 10e6/uL    Hemoglobin 14.7 11.7 - 15.7 g/dL    Hematocrit 42.9 35.0 - 47.0 %    MCV 94 78 - 100 fL    MCH 32.2 26.5 - 33.0 pg    MCHC 34.3 31.5 - 36.5 g/dL    RDW 14.2 10.0 - 15.0 %    Platelet Count 448 150 - 450 10e3/uL    % Neutrophils 68 %    % Lymphocytes 21 %    % Monocytes 8 %    % Eosinophils 2 %    % Basophils 1 %    % Immature Granulocytes 1 %    NRBCs per 100 WBC 0 <1 /100    Absolute Neutrophils 10.5 (H) 1.6 - 8.3 10e3/uL    Absolute Lymphocytes 3.3 0.8 - 5.3 10e3/uL    Absolute Monocytes 1.3 0.0 - 1.3 10e3/uL    Absolute Eosinophils 0.3 0.0 - 0.7 10e3/uL    Absolute Basophils 0.1 0.0 - 0.2 10e3/uL    Absolute Immature Granulocytes 0.1 <=0.4 10e3/uL    Absolute NRBCs 0.0 10e3/uL   UA with Microscopic reflex to Culture    Collection Time: 08/01/24  2:58 PM    Specimen: Urine, Clean Catch   Result Value Ref Range    Color Urine Dark Brown (A) Colorless,  Straw, Light Yellow, Yellow    Appearance Urine Cloudy (A) Clear    Glucose Urine Negative Negative mg/dL    Bilirubin Urine Negative Negative    Ketones Urine Negative Negative mg/dL    Specific Gravity Urine 1.040 (H) 1.001 - 1.030    Blood Urine >1.0 mg/dL (A) Negative    pH Urine 6.5 5.0 - 7.0    Protein Albumin Urine 200 (A) Negative mg/dL    Urobilinogen Urine <2.0 <2.0 mg/dL    Nitrite Urine Negative Negative    Leukocyte Esterase Urine 500 Naomi/uL (A) Negative    Mucus Urine Present (A) None Seen /LPF    RBC Urine >182 (H) <=2 /HPF    WBC Urine 92 (H) <=5 /HPF   Comprehensive metabolic panel    Collection Time: 08/02/24  6:11 AM   Result Value Ref Range    Sodium 141 135 - 145 mmol/L    Potassium 4.0 3.4 - 5.3 mmol/L    Carbon Dioxide (CO2) 27 22 - 29 mmol/L    Anion Gap 6 (L) 7 - 15 mmol/L    Urea Nitrogen 7.7 6.0 - 20.0 mg/dL    Creatinine 0.59 0.51 - 0.95 mg/dL    GFR Estimate >90 >60 mL/min/1.73m2    Calcium 8.4 (L) 8.8 - 10.4 mg/dL    Chloride 108 (H) 98 - 107 mmol/L    Glucose 87 70 - 99 mg/dL    Alkaline Phosphatase 65 40 - 150 U/L    AST 15 0 - 45 U/L    ALT 8 0 - 50 U/L    Protein Total 5.3 (L) 6.4 - 8.3 g/dL    Albumin 3.1 (L) 3.5 - 5.2 g/dL    Bilirubin Total 0.3 <=1.2 mg/dL   CBC with platelets    Collection Time: 08/02/24  6:11 AM   Result Value Ref Range    WBC Count 11.5 (H) 4.0 - 11.0 10e3/uL    RBC Count 3.81 3.80 - 5.20 10e6/uL    Hemoglobin 12.1 11.7 - 15.7 g/dL    Hematocrit 35.6 35.0 - 47.0 %    MCV 93 78 - 100 fL    MCH 31.8 26.5 - 33.0 pg    MCHC 34.0 31.5 - 36.5 g/dL    RDW 14.3 10.0 - 15.0 %    Platelet Count 365 150 - 450 10e3/uL       Pending Labs:  Unresulted Labs Ordered in the Past 30 Days of this Admission       Date and Time Order Name Status Description    8/1/2024  3:37 PM Urine Culture In process               Hany Stokes MD  [facility]  Phone: #[phone]

## 2024-08-02 NOTE — PROGRESS NOTES
Writer discussed After Visit Summary with patient noted no further questions about the discharge plan. Patient was wheeled by KAVEH Palacios down to the main lobby, transport was waiting. -Laura KIMBALL RN

## 2024-08-02 NOTE — PLAN OF CARE
Problem: Adult Inpatient Plan of Care  Goal: Optimal Comfort and Wellbeing  Outcome: Progressing    Goal Outcome Evaluation: Patient is medically cleared for discharge by Dr. Stokes noted order in place.-Laura KIMBALL RN

## 2024-08-02 NOTE — PROGRESS NOTES
Brief Urology Note    Patient return to ER due to pain after ureter stent removal (as planned) - review labs and imaging. There is some hydronephrosis down to level of where stone used to be - likely some edema still resolving, usually improves 24h after stent removal and pain should improve. If quite severe pain then can consider stent replacement.     Added ketorolac 15mg q6 fo additional pain control. Will re-assess later this morning    Juan Alberto Erazo MD

## 2024-08-02 NOTE — PROGRESS NOTES
Received confirmation from NSDELIA Contreras that Urology called and would like writer to know that Urology will contact patient directly to schedule a follow up. -Laura KIMBALL RN

## 2024-08-02 NOTE — DISCHARGE SUMMARY
"[facility] MEDICINE  DISCHARGE SUMMARY     Primary Care Physician: Dewayne Barrera  Admission Date: 8/1/2024   Discharge Provider: Hany Stokes MD Discharge Date: 8/2/2024    Diet:   Active Diet and Nourishment Order   Procedures    Regular Diet Adult    Diet     Code Status: Full Code   Activity: DCACTIVITY: Activity as tolerated        Condition at Discharge: Stable     REASON FOR PRESENTATION(See Admission Note for Details)   Left flank pain    PRINCIPAL & ACTIVE DISCHARGE DIAGNOSES     Principal Problem:    Renal colic on left side  Active Problems:    Recurrent kidney stones    Hydroureter on left    Asthma    Anxiety state  Status post cystoscopy/nephrolithiasis removal with left ureteral stent placement 7/29/2024  Leukocytosis  Secondary hypertension  Morbid obesity  Tobacco use disorder    Clinically Significant Risk Factors Present on Admission              # Hypoalbuminemia: Lowest albumin = 3.1 g/dL at 8/2/2024  6:11 AM, will monitor as appropriate             # Severe Obesity: Estimated body mass index is 41.63 kg/m  as calculated from the following:    Height as of this encounter: 1.6 m (5' 3\").    Weight as of this encounter: 106.6 kg (235 lb).       # Asthma: noted on problem list        PENDING LABS     Unresulted Labs Ordered in the Past 30 Days of this Admission       Date and Time Order Name Status Description    8/2/2024  1:35 PM Urine Culture In process           PROCEDURES ( this hospitalization only)      None    RECOMMENDATIONS TO OUTPATIENT PROVIDER FOR F/U VISIT   Follow-up Appointments     Follow-up and recommended labs and tests       1.  Follow-up with urology per their recommendations.  2.  Follow-up with primary care provider in 2 weeks.              DISPOSITION     Home    SUMMARY OF HOSPITAL COURSE:      Isha Valderrama is a 49 year old female with PMHx significant for previous renal calculi s/p URS (2014), asthma, GERD, anxiety, " depression, obesity.  7/29 underwent cystoscopy with nephrolithiasis removal and ureteral stent placement.  Since that time has been having significant left-sided pain.  8/1/2024 removed her stent at home and following this had severe 10 out of 10 left flank pain which did not improve accompanied by hematuria.  Seen in the ED.  Significant findings white count 15, UA with significant hematuria.  CT showed Left renal obstruction with moderate hydronephrosis and abrupt narrowing/caliber change of the mid left ureter at a similar location as the previously obstructing stone, which could be secondary to localized inflammation or inflammatory stricture. High attenuation or enhancement at the left ureterovesical junction suggesting localized inflammation or blood products. No obstructing ureteral calculi.  Urology contacted and recommended symptomatic treatment monitoring overnight.  Admitted.      1.  Urologic.  Kept NPO.  Received intravenous pain medication and Flomax.  Continued prior to admission Keflex.  Urine culture showed no growth.  8/2 patient's urine has improved in presentation but still complaining of 9 out of 10 left flank pain.  Tylenol and oxycodone were not effective.  Patient was given Toradol IV and transitioned to oral Dilaudid.  Following this pain substantially improved.  Requesting discharge home.  Cleared by urology.  Will follow-up with them as an outpatient.    Patient's initial leukocytosis did resolve.  Given prescription for nicotine patches.    Medically otherwise stable.    Discharge Medications with Med changes:     Current Discharge Medication List        START taking these medications    Details   HYDROmorphone (DILAUDID) 2 MG tablet Take 1 tablet (2 mg) by mouth every 6 hours as needed for severe pain  Qty: 10 tablet, Refills: 0    Associated Diagnoses: Recurrent kidney stones      nicotine (NICODERM CQ) 21 MG/24HR 24 hr patch Place 1 patch onto the skin daily  Qty: 28 patch, Refills: 0     Associated Diagnoses: Tobacco use disorder      ondansetron (ZOFRAN ODT) 4 MG ODT tab Take 1 tablet (4 mg) by mouth every 6 hours as needed for nausea or vomiting  Qty: 10 tablet, Refills: 0    Associated Diagnoses: Recurrent kidney stones      tamsulosin (FLOMAX) 0.4 MG capsule Take 1 capsule (0.4 mg) by mouth daily  Qty: 15 capsule, Refills: 0    Associated Diagnoses: Recurrent kidney stones           CONTINUE these medications which have NOT CHANGED    Details   acetaminophen (TYLENOL) 500 MG tablet Take 500-1,000 mg by mouth every 6 hours as needed for mild pain (do not exceed more than 4,000 mg in 24 hours)      albuterol (PROAIR HFA/PROVENTIL HFA/VENTOLIN HFA) 108 (90 Base) MCG/ACT inhaler Inhale 1-2 puffs into the lungs every 6 hours as needed for shortness of breath, wheezing or cough      albuterol (PROVENTIL) (2.5 MG/3ML) 0.083% neb solution Take 2.5 mg by nebulization every 6 hours as needed      cephALEXin (KEFLEX) 500 MG capsule Take 1 capsule (500 mg) by mouth 2 times daily for 7 days  Qty: 14 capsule, Refills: 0    Associated Diagnoses: Left nephrolithiasis      !! DULoxetine (CYMBALTA) 30 MG capsule Take 1 Capsule (30 mg) by mouth daily. Take with 60mg capsule to equal 90mg*      !! DULoxetine (CYMBALTA) 60 MG capsule Take 60 mg by mouth daily Taken with 30 mg capsule      Fluticasone Propionate, Inhal, (FLOVENT IN) Inhale 1 puff into the lungs daily as needed Formulation not known      ibuprofen (ADVIL/MOTRIN) 200 MG tablet Take 3 tablets (600 mg) by mouth every 6 hours as needed for mild pain  Qty: 60 tablet, Refills: 0    Associated Diagnoses: Left nephrolithiasis      LORazepam (ATIVAN) 0.5 MG tablet Take 0.5-1 mg by mouth once as needed for anxiety (use 30 min prior to dental appointmnet)      tolterodine ER (DETROL LA) 2 MG 24 hr capsule Take 1 capsule (2 mg) by mouth daily as needed (bladder spasms/urinary urgency)  Qty: 7 capsule, Refills: 0    Comments: If cost prohibitive, can substitute  "for oxybutynin 5 mg p.o. 3 times daily as needed for bladder spasms x21 tablets or oxybutynin 10 mg extended release p.o. x7 tablets  Associated Diagnoses: Left nephrolithiasis       !! - Potential duplicate medications found. Please discuss with provider.            Rationale for medication changes:      Dilaudid for pain control.  Flomax for passage of urine.  Nicotine for tobacco use.  Zofran for nausea.      Consults     UROLOGY IP CONSULT  PAIN MANAGEMENT ADULT IP CONSULT      Immunizations given this encounter     Most Recent Immunizations   Administered Date(s) Administered    COVID-19 Vaccine ([employer]) 03/08/2021           Anticoagulation Information      Recent INR results: No results for input(s): \"INR\" in the last 168 hours.  Warfarin doses (if applicable) or name of other anticoagulant: N/A      SIGNIFICANT IMAGING FINDINGS     Results for orders placed or performed during the hospital encounter of 08/01/24   CT Abdomen Pelvis w Contrast    Impression    IMPRESSION:   Left renal obstruction with moderate hydronephrosis and abrupt narrowing/caliber change of the mid left ureter at a similar location as the previously obstructing stone, which could be secondary to localized inflammation or inflammatory stricture. High   attenuation or enhancement at the left ureterovesical junction suggesting localized inflammation or blood products. No obstructing ureteral calculi.       No results found for this or any previous visit (from the past 4320 hour(s)).    SIGNIFICANT LABORATORY FINDINGS     Most Recent 3 CBC's:  Recent Labs   Lab Test 08/02/24  0611 08/01/24  1259 10/02/23  0622   WBC 11.5* 15.5* 15.6*   HGB 12.1 14.7 13.6   MCV 93 94 95    448 414     Most Recent 3 BMP's:  Recent Labs   Lab Test 08/02/24  0611 08/01/24  1259 10/02/23  0622    140 138   POTASSIUM 4.0 4.8 4.2   CHLORIDE 108* 103 101   CO2 27 29 29   BUN 7.7 11.4 10.6   CR 0.59 0.72 0.59   ANIONGAP 6* 8 8   ALMAS 8.4* 9.4 8.6   GLC 87 " "97 103*     Most Recent 2 LFT's:  Recent Labs   Lab Test 08/02/24  0611 08/01/24  1259   AST 15 16   ALT 8 14   ALKPHOS 65 80   BILITOTAL 0.3 0.3     Most Recent 3 INR's:No lab results found.  Most Recent Urinalysis:  Recent Labs   Lab Test 08/02/24  1318 06/26/22  1829 11/06/19  1614   COLOR Light Orange*   < > Yellow   APPEARANCE Turbid*   < > Clear   URINEGLC Negative   < > Negative   URINEBILI Negative   < > Negative   URINEKETONE Negative   < > Negative   SG 1.013   < > 1.015   UBLD >1.0 mg/dL*   < > Small*   URINEPH 6.5   < > 7.0   PROTEIN 20*   < > Negative   UROBILINOGEN  --   --  2.0 E.U./dL   NITRITE Negative   < > Negative   LEUKEST 75 Naomi/uL*   < > Negative   RBCU >182*   < > 3-5*   WBCU 132*   < > 0-5    < > = values in this interval not displayed.     No results found for: \"CTROPT\"   7-Day Micro Results       Collected Updated Procedure Result Status      08/02/2024 1318 08/02/2024 1335 Urine Culture [[id number]]   Urine, Midstream    In process Component Value   No component results               08/01/2024 1458 08/02/2024 1308 Urine Culture [[id number]]   Urine, Clean Catch    Final result Component Value   Culture No Growth                         Discharge Orders        Reason for your hospital stay    Left renal pain after ureteral stent removal     Follow-up and recommended labs and tests     1.  Follow-up with urology per their recommendations.  2.  Follow-up with primary care provider in 2 weeks.     Activity    Your activity upon discharge: activity as tolerated     When to contact your care team    Call urology if you have any of the following: temperature greater than 100.5,  increased shortness of breath, increased drainage, increased swelling, increased pain, or redness to urine does not resolve.     Discharge Instructions    Recommend smoking cessation.  Nicotine patches sent to pharmacy.     Diet    Follow this diet upon discharge: Orders Placed This Encounter      Regular Diet Adult "       Examination   Physical Exam   Temp:  [97.4  F (36.3  C)-98.8  F (37.1  C)] 98.8  F (37.1  C)  Pulse:  [54-69] 59  Resp:  [16-20] 18  BP: (111-174)/(55-84) 113/65  SpO2:  [91 %-97 %] 94 %  Wt Readings from Last 4 Encounters:   08/01/24 106.6 kg (235 lb)   07/29/24 106.6 kg (235 lb)   07/19/24 113.4 kg (250 lb)   01/17/24 106.6 kg (235 lb)       Please see EMR for more detailed significant labs, imaging, consultant notes etc.    I, Hany Stokes MD, personally saw the patient today and spent greater than 30 minutes discharging this patient.    Hany Stokes MD  [facility]    CC:Dewayne Barrera

## 2024-08-02 NOTE — PROGRESS NOTES
Dr. Stokes made aware of patient's adequate pain control with oral dilaudid and wanted to go home. Per Dr. Stokes he is okay discharging patient, canceled pain team consult, and instructed Writer to reach out to urology team to schedule a follow up visit outpatient. Writer requested NST Diane to page urology team with Dr. Stokes's instruction. NST verbalized in agreement. -Laura KIMBALL RN

## 2024-08-03 LAB — BACTERIA UR CULT: NO GROWTH

## 2024-08-05 ENCOUNTER — TELEPHONE (OUTPATIENT)
Dept: UROLOGY | Facility: CLINIC | Age: 49
End: 2024-08-05

## 2024-08-05 DIAGNOSIS — N20.0 RECURRENT KIDNEY STONES: ICD-10-CM

## 2024-08-05 RX ORDER — HYDROMORPHONE HYDROCHLORIDE 2 MG/1
2 TABLET ORAL EVERY 6 HOURS PRN
Qty: 10 TABLET | Refills: 0 | Status: SHIPPED | OUTPATIENT
Start: 2024-08-05

## 2024-08-05 NOTE — TELEPHONE ENCOUNTER
Spoke with patient who had her stent out on August 1st and then ended up in emergency room on 8/2.  She states that she is still having pain and hematuria, but that it is improving.  She is requesting a refill of pain medication, she has her follow up scheduled in September.  Last refill of 10 tabs ordered on 8/2 and patient states she is out of medication. Please advise.  Aby Riley RN

## 2024-08-25 ENCOUNTER — HEALTH MAINTENANCE LETTER (OUTPATIENT)
Age: 49
End: 2024-08-25

## 2025-07-12 ENCOUNTER — HEALTH MAINTENANCE LETTER (OUTPATIENT)
Age: 50
End: 2025-07-12